# Patient Record
Sex: MALE | Race: WHITE | NOT HISPANIC OR LATINO
[De-identification: names, ages, dates, MRNs, and addresses within clinical notes are randomized per-mention and may not be internally consistent; named-entity substitution may affect disease eponyms.]

---

## 2021-03-13 ENCOUNTER — TRANSCRIPTION ENCOUNTER (OUTPATIENT)
Age: 46
End: 2021-03-13

## 2021-05-03 ENCOUNTER — APPOINTMENT (OUTPATIENT)
Dept: RHEUMATOLOGY | Facility: CLINIC | Age: 46
End: 2021-05-03

## 2021-05-13 ENCOUNTER — NON-APPOINTMENT (OUTPATIENT)
Age: 46
End: 2021-05-13

## 2021-05-14 ENCOUNTER — APPOINTMENT (OUTPATIENT)
Dept: RHEUMATOLOGY | Facility: CLINIC | Age: 46
End: 2021-05-14
Payer: COMMERCIAL

## 2021-05-14 ENCOUNTER — NON-APPOINTMENT (OUTPATIENT)
Age: 46
End: 2021-05-14

## 2021-05-14 VITALS
HEIGHT: 73 IN | DIASTOLIC BLOOD PRESSURE: 62 MMHG | WEIGHT: 188 LBS | TEMPERATURE: 97.8 F | HEART RATE: 67 BPM | BODY MASS INDEX: 24.92 KG/M2 | OXYGEN SATURATION: 97 % | SYSTOLIC BLOOD PRESSURE: 102 MMHG

## 2021-05-14 DIAGNOSIS — I73.00 RAYNAUD'S SYNDROME W/OUT GANGRENE: ICD-10-CM

## 2021-05-14 DIAGNOSIS — G25.0 ESSENTIAL TREMOR: ICD-10-CM

## 2021-05-14 DIAGNOSIS — F41.1 GENERALIZED ANXIETY DISORDER: ICD-10-CM

## 2021-05-14 DIAGNOSIS — Z82.49 FAMILY HISTORY OF ISCHEMIC HEART DISEASE AND OTHER DISEASES OF THE CIRCULATORY SYSTEM: ICD-10-CM

## 2021-05-14 PROCEDURE — 99072 ADDL SUPL MATRL&STAF TM PHE: CPT

## 2021-05-14 PROCEDURE — 99204 OFFICE O/P NEW MOD 45 MIN: CPT

## 2021-05-14 RX ORDER — IBUPROFEN 800 MG/1
TABLET, FILM COATED ORAL
Refills: 0 | Status: ACTIVE | COMMUNITY

## 2021-05-14 RX ORDER — ESCITALOPRAM OXALATE 20 MG/1
20 TABLET, FILM COATED ORAL
Refills: 0 | Status: ACTIVE | COMMUNITY

## 2021-05-14 RX ORDER — GABAPENTIN 100 MG
100 TABLET ORAL
Refills: 0 | Status: ACTIVE | COMMUNITY

## 2021-05-14 NOTE — REVIEW OF SYSTEMS
[As noted in HPI] : as noted in HPI [Negative] : Heme/Lymph [FreeTextEntry5] : triphasic changes  [FreeTextEntry9] : bony enlargement R/L 3 non tender

## 2021-05-14 NOTE — HISTORY OF PRESENT ILLNESS
[FreeTextEntry1] : (Initial HPI 5/14/21) \par 44 yo wm with hx SHIREEN, essential tremor and raynauds, 2 ys ago noted R2 toe- whiteness/ numb eventually all toes/ fingers. w/ numbness .. progressed and now note nearly immediate change with sudden change in temperature.. triphasic changes.. to date no treatment.. no previous digital ulcerations\par Denies sclerodactyly .. but tightness/ puffiness, no joint pain..  +  Chronic dry skin\par ROS: negative overall with significant negatives.. \par No rash, ischemic pain\par NO cytopenisa/ bleeding/ clotting dyscrasias\par Not associated with other sx\par Non smoker.. coffee 3-4 cups/ day no other stimulants\par \par Labs 1/21 + \par nl CBC, CMP, ESR/ CRP, C3/4 (w/ CH50 low 34), TSH, PTH, CK, SPEP, ferritin, PTT, VWF, B12/ folate, PSA w/ neg RF/ CCP, CHAVEZ and subserologies to include APS/ LAC, SCL70, ANCA/ PR3/ MPO, cryoglobulins, ACE, TPO/ TG, tTG, Hep B/ C and neg HsAb, quantiferon gold, Vit D, uric acid, UA, Vit D 51, HbA1c 5.6\par Lipids: , HD 66, ,  \par \par Recurrent sinus infections allegry r/t - 2-3/ yr nothing this yr \par \par Essential tremor x many ys.. in past on BB, nothing needed recently did not have raynauds at that time. \par \par FH: \par sister and MA + raynauds..

## 2021-05-14 NOTE — ASSESSMENT
[FreeTextEntry1] : 46 yo wm with hx SHIREEN, essential tremor and raynauds\par \par 1) Raynauds:  2 ys ago noted R2 toe- whiteness/ numb eventually all toes/ fingers. w/ numbness .. progressed and now note nearly immediate change with sudden change in temperature.. triphasic changes.. to date no treatment.. no previous digital ulcerations\par Denies sclerodactyly .. but tightness/ puffiness, no joint pain..  +  Chronic dry skin\par Comprehensive serologies by PCP negative though SEVERINO was not done.. will do now\par Mildly elevated  (likely due to daily intense exercise), will repeat now \par At this point there is little to suggest CTD, suggesting primary raynauds.  Advise to continue w/ non pharm tx and if necessary in future can consider CCB. Recommend avoiding smoking/ minimize caffeine and other stimulants and avoid BB if possible- all can exacerbate sx. \par FH: \par sister and MA + raynauds..\par \par \par 2) Recurrent sinus infections allegry r/t - 2-3/ yr nothing this yr \par \par 3) Essential tremor x many ys.. in past on BB, nothing needed recently did not have raynauds at that time. \par \par \par Plan: \par - as above non pharm tx.. \par - Check SEVERINO and repeat CK. no other labs needed \par - rto as needed \par -Is aware to call if there is any change in her underlying symptoms.\par \par

## 2021-05-14 NOTE — PHYSICAL EXAM
[General Appearance - Alert] : alert [General Appearance - In No Acute Distress] : in no acute distress [PERRL With Normal Accommodation] : pupils were equal in size, round, and reactive to light [Sclera] : the sclera and conjunctiva were normal [Extraocular Movements] : extraocular movements were intact [Outer Ear] : the ears and nose were normal in appearance [Nasal Cavity] : the nasal mucosa and septum were normal [Oropharynx] : the oropharynx was normal [Neck Appearance] : the appearance of the neck was normal [Neck Cervical Mass (___cm)] : no neck mass was observed [Jugular Venous Distention Increased] : there was no jugular-venous distention [Thyroid Diffuse Enlargement] : the thyroid was not enlarged [Thyroid Nodule] : there were no palpable thyroid nodules [Auscultation Breath Sounds / Voice Sounds] : lungs were clear to auscultation bilaterally [Heart Rate And Rhythm] : heart rate was normal and rhythm regular [Heart Sounds] : normal S1 and S2 [Heart Sounds Gallop] : no gallops [Murmurs] : no murmurs [Heart Sounds Pericardial Friction Rub] : no pericardial rub [Edema] : there was no peripheral edema [Bowel Sounds] : normal bowel sounds [Abdomen Soft] : soft [Abdomen Tenderness] : non-tender [Abdomen Mass (___ Cm)] : no abdominal mass palpated [Cervical Lymph Nodes Enlarged Posterior Bilaterally] : posterior cervical [Cervical Lymph Nodes Enlarged Anterior Bilaterally] : anterior cervical [Supraclavicular Lymph Nodes Enlarged Bilaterally] : supraclavicular [No CVA Tenderness] : no ~M costovertebral angle tenderness [No Spinal Tenderness] : no spinal tenderness [Abnormal Walk] : normal gait [Nail Clubbing] : no clubbing  or cyanosis of the fingernails [Musculoskeletal - Swelling] : no joint swelling seen [Motor Tone] : muscle strength and tone were normal [Skin Color & Pigmentation] : normal skin color and pigmentation [Skin Turgor] : normal skin turgor [] : no rash [Sensation] : the sensory exam was normal to light touch and pinprick [No Focal Deficits] : no focal deficits [Oriented To Time, Place, And Person] : oriented to person, place, and time [Impaired Insight] : insight and judgment were intact [Affect] : the affect was normal [FreeTextEntry1] : no tremor observed

## 2021-05-14 NOTE — DATA REVIEWED
[FreeTextEntry1] : Labs 1/21 + \par nl CBC, CMP, ESR/ CRP, C3/4 (w/ CH50 low 34), TSH, PTH, CK, SPEP, ferritin, PTT, VWF, B12/ folate, PSA w/ neg RF/ CCP, CHAVEZ and subserologies to include APS/ LAC, SCL70, ANCA/ PR3/ MPO, cryoglobulins, ACE, TPO/ TG, tTG, Hep B/ C and neg HsAb, quantiferon gold, Vit D, uric acid, UA, Vit D 51, HbA1c 5.6\par Lipids: , HD 66, ,

## 2021-06-08 ENCOUNTER — RESULT REVIEW (OUTPATIENT)
Age: 46
End: 2021-06-08

## 2022-01-31 ENCOUNTER — EMERGENCY (EMERGENCY)
Facility: HOSPITAL | Age: 47
LOS: 0 days | Discharge: HOME | End: 2022-02-01
Attending: EMERGENCY MEDICINE
Payer: COMMERCIAL

## 2022-01-31 VITALS — HEIGHT: 72 IN | WEIGHT: 184.97 LBS

## 2022-01-31 DIAGNOSIS — R11.14 BILIOUS VOMITING: ICD-10-CM

## 2022-01-31 DIAGNOSIS — R10.9 UNSPECIFIED ABDOMINAL PAIN: ICD-10-CM

## 2022-01-31 LAB
BASOPHILS # BLD AUTO: 0.08 K/UL — SIGNIFICANT CHANGE UP (ref 0–0.2)
BASOPHILS NFR BLD AUTO: 0.6 % — SIGNIFICANT CHANGE UP (ref 0–2)
EOSINOPHIL # BLD AUTO: 0.32 K/UL — SIGNIFICANT CHANGE UP (ref 0–0.5)
EOSINOPHIL NFR BLD AUTO: 2.3 % — SIGNIFICANT CHANGE UP (ref 0–6)
HCT VFR BLD CALC: 38.4 % — LOW (ref 39–50)
HGB BLD-MCNC: 12.9 G/DL — LOW (ref 13–17)
IMM GRANULOCYTES NFR BLD AUTO: 0.4 % — SIGNIFICANT CHANGE UP (ref 0–1.5)
LYMPHOCYTES # BLD AUTO: 1.25 K/UL — SIGNIFICANT CHANGE UP (ref 1–3.3)
LYMPHOCYTES # BLD AUTO: 9 % — LOW (ref 13–44)
MCHC RBC-ENTMCNC: 32.1 PG — SIGNIFICANT CHANGE UP (ref 27–34)
MCHC RBC-ENTMCNC: 33.6 GM/DL — SIGNIFICANT CHANGE UP (ref 32–36)
MCV RBC AUTO: 95.5 FL — SIGNIFICANT CHANGE UP (ref 80–100)
MONOCYTES # BLD AUTO: 1.34 K/UL — HIGH (ref 0–0.9)
MONOCYTES NFR BLD AUTO: 9.7 % — SIGNIFICANT CHANGE UP (ref 2–14)
NEUTROPHILS # BLD AUTO: 10.8 K/UL — HIGH (ref 1.8–7.4)
NEUTROPHILS NFR BLD AUTO: 78 % — HIGH (ref 43–77)
PLATELET # BLD AUTO: 211 K/UL — SIGNIFICANT CHANGE UP (ref 150–400)
RBC # BLD: 4.02 M/UL — LOW (ref 4.2–5.8)
RBC # FLD: 12.7 % — SIGNIFICANT CHANGE UP (ref 10.3–14.5)
WBC # BLD: 13.84 K/UL — HIGH (ref 3.8–10.5)
WBC # FLD AUTO: 13.84 K/UL — HIGH (ref 3.8–10.5)

## 2022-01-31 PROCEDURE — 96374 THER/PROPH/DIAG INJ IV PUSH: CPT

## 2022-01-31 PROCEDURE — 99285 EMERGENCY DEPT VISIT HI MDM: CPT

## 2022-01-31 PROCEDURE — 93005 ELECTROCARDIOGRAM TRACING: CPT

## 2022-01-31 PROCEDURE — 85025 COMPLETE CBC W/AUTO DIFF WBC: CPT

## 2022-01-31 PROCEDURE — 74177 CT ABD & PELVIS W/CONTRAST: CPT | Mod: MA

## 2022-01-31 PROCEDURE — 96375 TX/PRO/DX INJ NEW DRUG ADDON: CPT

## 2022-01-31 PROCEDURE — 93010 ELECTROCARDIOGRAM REPORT: CPT

## 2022-01-31 PROCEDURE — 84484 ASSAY OF TROPONIN QUANT: CPT

## 2022-01-31 PROCEDURE — 99285 EMERGENCY DEPT VISIT HI MDM: CPT | Mod: 25

## 2022-01-31 PROCEDURE — 83690 ASSAY OF LIPASE: CPT

## 2022-01-31 PROCEDURE — 80053 COMPREHEN METABOLIC PANEL: CPT

## 2022-01-31 PROCEDURE — 36415 COLL VENOUS BLD VENIPUNCTURE: CPT

## 2022-01-31 RX ORDER — MORPHINE SULFATE 50 MG/1
4 CAPSULE, EXTENDED RELEASE ORAL ONCE
Refills: 0 | Status: DISCONTINUED | OUTPATIENT
Start: 2022-01-31 | End: 2022-01-31

## 2022-01-31 RX ORDER — FAMOTIDINE 10 MG/ML
20 INJECTION INTRAVENOUS ONCE
Refills: 0 | Status: COMPLETED | OUTPATIENT
Start: 2022-01-31 | End: 2022-01-31

## 2022-01-31 RX ORDER — SODIUM CHLORIDE 9 MG/ML
1000 INJECTION INTRAMUSCULAR; INTRAVENOUS; SUBCUTANEOUS ONCE
Refills: 0 | Status: COMPLETED | OUTPATIENT
Start: 2022-01-31 | End: 2022-01-31

## 2022-01-31 RX ORDER — ONDANSETRON 8 MG/1
4 TABLET, FILM COATED ORAL ONCE
Refills: 0 | Status: COMPLETED | OUTPATIENT
Start: 2022-01-31 | End: 2022-01-31

## 2022-01-31 RX ADMIN — SODIUM CHLORIDE 1000 MILLILITER(S): 9 INJECTION INTRAMUSCULAR; INTRAVENOUS; SUBCUTANEOUS at 23:54

## 2022-01-31 RX ADMIN — ONDANSETRON 4 MILLIGRAM(S): 8 TABLET, FILM COATED ORAL at 23:54

## 2022-01-31 RX ADMIN — FAMOTIDINE 20 MILLIGRAM(S): 10 INJECTION INTRAVENOUS at 23:54

## 2022-01-31 RX ADMIN — MORPHINE SULFATE 4 MILLIGRAM(S): 50 CAPSULE, EXTENDED RELEASE ORAL at 23:54

## 2022-01-31 NOTE — ED ADULT NURSE NOTE - OBJECTIVE STATEMENT
Pt ambulatory to ED for lower abd pain since 1700 this evening. Symptoms include nausea pain and chills. Denies fever, diarrhea or chest pain. No obvious signs of trauma. No acute distress noted at this time.

## 2022-01-31 NOTE — ED ADULT NURSE NOTE - NSIMPLEMENTINTERV_GEN_ALL_ED
Implemented All Fall Risk Interventions:  [place] to call system. Call bell, personal items and telephone within reach. Instruct patient to call for assistance. Room bathroom lighting operational. Non-slip footwear when patient is off stretcher. Physically safe environment: no spills, clutter or unnecessary equipment. Stretcher in lowest position, wheels locked, appropriate side rails in place. Provide visual cue, wrist band, yellow gown, etc. Monitor gait and stability. Monitor for mental status changes and reorient to person, place, and time. Review medications for side effects contributing to fall risk. Reinforce activity limits and safety measures with patient and family.

## 2022-01-31 NOTE — ED ADULT TRIAGE NOTE - CHIEF COMPLAINT QUOTE
Pt presents to the ED c/o lower abdominal pain and cramping since 5pm. +Nausea. Denies dark stools, fevers, dysuria, cp, sob, dizziness. No other complaints.

## 2022-02-01 VITALS
SYSTOLIC BLOOD PRESSURE: 129 MMHG | OXYGEN SATURATION: 96 % | RESPIRATION RATE: 17 BRPM | HEART RATE: 49 BPM | DIASTOLIC BLOOD PRESSURE: 84 MMHG | TEMPERATURE: 98 F

## 2022-02-01 LAB
ALBUMIN SERPL ELPH-MCNC: 3.6 G/DL — SIGNIFICANT CHANGE UP (ref 3.3–5)
ALP SERPL-CCNC: 79 U/L — SIGNIFICANT CHANGE UP (ref 40–120)
ALT FLD-CCNC: 33 U/L — SIGNIFICANT CHANGE UP (ref 12–78)
ANION GAP SERPL CALC-SCNC: 6 MMOL/L — SIGNIFICANT CHANGE UP (ref 5–17)
AST SERPL-CCNC: 19 U/L — SIGNIFICANT CHANGE UP (ref 15–37)
BILIRUB SERPL-MCNC: 0.4 MG/DL — SIGNIFICANT CHANGE UP (ref 0.2–1.2)
BUN SERPL-MCNC: 15 MG/DL — SIGNIFICANT CHANGE UP (ref 7–23)
CALCIUM SERPL-MCNC: 9 MG/DL — SIGNIFICANT CHANGE UP (ref 8.5–10.1)
CHLORIDE SERPL-SCNC: 103 MMOL/L — SIGNIFICANT CHANGE UP (ref 96–108)
CO2 SERPL-SCNC: 28 MMOL/L — SIGNIFICANT CHANGE UP (ref 22–31)
CREAT SERPL-MCNC: 0.93 MG/DL — SIGNIFICANT CHANGE UP (ref 0.5–1.3)
GLUCOSE SERPL-MCNC: 144 MG/DL — HIGH (ref 70–99)
LIDOCAIN IGE QN: 84 U/L — SIGNIFICANT CHANGE UP (ref 73–393)
POTASSIUM SERPL-MCNC: 3.7 MMOL/L — SIGNIFICANT CHANGE UP (ref 3.5–5.3)
POTASSIUM SERPL-SCNC: 3.7 MMOL/L — SIGNIFICANT CHANGE UP (ref 3.5–5.3)
PROT SERPL-MCNC: 6.8 GM/DL — SIGNIFICANT CHANGE UP (ref 6–8.3)
SODIUM SERPL-SCNC: 137 MMOL/L — SIGNIFICANT CHANGE UP (ref 135–145)

## 2022-02-01 PROCEDURE — 74177 CT ABD & PELVIS W/CONTRAST: CPT | Mod: 26,MA

## 2022-02-01 RX ORDER — ONDANSETRON 8 MG/1
1 TABLET, FILM COATED ORAL
Qty: 12 | Refills: 0
Start: 2022-02-01 | End: 2022-02-03

## 2022-02-01 NOTE — ED PROVIDER NOTE - CLINICAL SUMMARY MEDICAL DECISION MAKING FREE TEXT BOX
pt with nv and abdominal pain will hydrate, get labs, give pepcid and zofran re evaluate possible ct abd

## 2022-02-01 NOTE — ED PROVIDER NOTE - PATIENT PORTAL LINK FT
You can access the FollowMyHealth Patient Portal offered by [facility] by registering at the following website: [email or web address]. By joining [employer]’s FollowMyHealth portal, you will also be able to view your health information using other applications (apps) compatible with our system.

## 2022-02-01 NOTE — ED PROVIDER NOTE - NSFOLLOWUPINSTRUCTIONS_ED_ALL_ED_FT
WHAT YOU NEED TO KNOW:    Acute nausea and vomiting start suddenly, worsen quickly, and last a short time.    DISCHARGE INSTRUCTIONS:    Return to the emergency department if:   •You see blood in your vomit or your bowel movements.      •You have sudden, severe pain in your chest and upper abdomen after hard vomiting or retching.      •You have swelling in your neck and chest.       •You are dizzy, cold, and thirsty and your eyes and mouth are dry.      •You are urinating very little or not at all.      •You have muscle weakness, leg cramps, and trouble breathing.       •Your heart is beating much faster than normal.       •You continue to vomit for more than 48 hours.       Contact your healthcare provider if:   •You have frequent dry heaves (vomiting but nothing comes out).      •Your nausea and vomiting does not get better or go away after you use medicine.      •You have questions or concerns about your condition or treatment.      Medicines: You may need any of the following:   •Medicines may be given to calm your stomach and stop your vomiting. You may also need medicines to help you feel more relaxed or to stop nausea and vomiting caused by motion sickness.      •Gastrointestinal stimulants are used to help empty your stomach and bowels. This may help decrease nausea and vomiting.      •Take your medicine as directed. Contact your healthcare provider if you think your medicine is not helping or if you have side effects. Tell him or her if you are allergic to any medicine. Keep a list of the medicines, vitamins, and herbs you take. Include the amounts, and when and why you take them. Bring the list or the pill bottles to follow-up visits. Carry your medicine list with you in case of an emergency.      Prevent or manage acute nausea and vomiting:   •Do not drink alcohol. Alcohol may upset or irritate your stomach. Too much alcohol can also cause acute nausea and vomiting.      •Control stress. Headaches due to stress may cause nausea and vomiting. Find ways to relax and manage your stress. Get more rest and sleep.      •Drink more liquids as directed. Vomiting can lead to dehydration. It is important to drink more liquids to help replace lost body fluids. Ask your healthcare provider how much liquid to drink each day and which liquids are best for you. Your provider may recommend that you drink an oral rehydration solution (ORS). ORS contains water, salts, and sugar that are needed to replace the lost body fluids. Ask what kind of ORS to use, how much to drink, and where to get it.      •Eat smaller meals, more often. Eat small amounts of food every 2 to 3 hours, even if you are not hungry. Food in your stomach may decrease your nausea.      •Talk to your healthcare provider before you take over-the-counter (OTC) medicines. These medicines can cause serious problems if you use certain other medicines, or you have a medical condition. You may have problems if you use too much or use them for longer than the label says. Follow directions on the label carefully.       Follow up with your healthcare provider as directed: Write down your questions so you remember to ask them during your follow-up visits.       © Copyright [employer] 2022           back to top                          © Copyright [employer] 2022

## 2022-02-01 NOTE — ED PROVIDER NOTE - PHYSICAL EXAMINATION
Gen:  Well appearing in NAD  Head:  NC/AT  HEENT: pupils perrl,no pharyngeal erythema, uvula midline  Cardiac: S1S2, RRR  Abd: Soft, rlq ttp  Resp: No distress, CTA   musculoskeletal:: no deformities, no swelling, strength +5/+5  Skin: warm and dry as visualized, no rashes  Neuro: STREET, cn2-12, aao x 4  Psych:alert, cooperative, appropriate mood and affect for situation

## 2022-03-28 PROBLEM — Z78.9 OTHER SPECIFIED HEALTH STATUS: Chronic | Status: ACTIVE | Noted: 2022-02-01

## 2022-04-07 ENCOUNTER — APPOINTMENT (OUTPATIENT)
Dept: ORTHOPEDIC SURGERY | Facility: CLINIC | Age: 47
End: 2022-04-07
Payer: COMMERCIAL

## 2022-04-07 VITALS — HEIGHT: 73 IN | BODY MASS INDEX: 24.92 KG/M2 | WEIGHT: 188 LBS

## 2022-04-07 PROCEDURE — 99214 OFFICE O/P EST MOD 30 MIN: CPT

## 2022-04-11 NOTE — DATA REVIEWED
[MRI] : MRI [Left] : left [Knee] : knee [Report was reviewed and noted in the chart] : The report was reviewed and noted in the chart [I reviewed the films/CD and agree] : I reviewed the films/CD and agree [FreeTextEntry1] : 1. Medial meniscal tear.\par 2. Arthrosis with joint effusion.\par 3. Healed MCL tear at the femur with thickening.\par 4. Hamstring and gastrocnemius tendinopathy with interstitial tears, soft tissue edema, and bursitis.

## 2022-04-11 NOTE — HISTORY OF PRESENT ILLNESS
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

## 2022-04-11 NOTE — PHYSICAL EXAM
[de-identified] : The patient is a well appearing 46 year year old male of their stated age.\par Patient ambulates with a normal gait.\par Negative straight leg raise bilateral\par \par Effected Knee:                         	\par ROM:  0-145 degrees\par \par Lachman: Negative\par Pivot Shift: Negative\par Anterior Drawer: Negative\par Posterior Drawer / Sag: Negative\par Varus Stress 0 degrees: Stable\par Varus Stress 30 degrees: Stable\par Valgus Stress 0 degrees: Stable\par Valgus Stress 30 degrees: Stable\par Medial Real: POSITIVE\par Lateral Real: Negative\par Patella Glide: 2+\par Patella Apprehension: Negative\par Patella Grind: Negative\par \par Palpation:\par Medial Joint Line: TTP\par Lateral Joint Line: Nontender\par Medial Collateral Ligament: Nontender\par Lateral Collateral Ligament/PLC: Nontender\par Distal Femur: Nontender\par Proximal Tibia: Nontender\par Tibial Tubercle: Nontender\par Distal Pole Patella: Nontender\par Quadriceps Tendon: Nontender &  Intact\par Patella Tendon: Nontender &  Intact\par Medial Distal Hamstring/PES: Nontender\par Lateral Distal Hamstring: Nontender & Stable\par Iliotibial Band: Nontender\par Medial Patellofemoral Ligament: Nontender\par Adductor: Nontender\par Proximal GSC-Plantaris: Nontender\par Calf: Supple & Nontender\par \par Inspection:\par Deformity: No\par Erythema: No\par Ecchymosis: No\par Abrasions: No\par Effusion: No\par Prepatellar Bursitis: No\par \par Neurologic Exam:\par Sensation L4-S1: Grossly Intact\par \par Motor Exam:\par Quadriceps: 5 out of 5\par Hamstrings: 5 out of 5\par EHL: 5 out of 5\par FHL: 5 out of 5\par TA: 5 out of 5\par GS: 5 out of 5\par \par Circulatory/Pulses:\par Dorsalis Pedis: 2+\par Posterior Tibialis: 2+\par \par Additional Pertinent Findings: None\par \par Contralateral Knee:                           	\par ROM: 0-145 degrees\par \par Other Pertinent Findings: None\par

## 2022-06-09 ENCOUNTER — APPOINTMENT (OUTPATIENT)
Dept: ORTHOPEDIC SURGERY | Facility: CLINIC | Age: 47
End: 2022-06-09
Payer: COMMERCIAL

## 2022-06-09 VITALS — BODY MASS INDEX: 24.92 KG/M2 | WEIGHT: 188 LBS | HEIGHT: 73 IN

## 2022-06-09 PROCEDURE — 99214 OFFICE O/P EST MOD 30 MIN: CPT | Mod: 57

## 2022-06-13 NOTE — DISCUSSION/SUMMARY
[Medication Risks Reviewed] : Medication risks reviewed [Surgical risks reviewed] : Surgical risks reviewed [de-identified] : BROCKCARMEN and I discussed His issues. His knee pain is likely related to his meniscal tear, and as a result of him not improving significantly with adequate and extensive nonoperative treatment, he is indicated for surgical treatment. Discussed findings of today's exam and possible causes of patient's pain. Educated patient on their most probable diagnosis of MMT. Reviewed possible courses of treatment, and we collaboratively decided best course of treatment at this time will include:\par \par 1. proceed with arthroscopic partial meniscectomy\par \par Consent:  Conservative treatment, nontreatment, nonsurgical intervention and surgical intervention treatment options have been reviewed with the patient.  The patient continues to be symptomatic and has failed conservative treatment, and elects to move forward with surgical intervention.  The patient is indicated for [the above procedure] and all indicated procedures. As such the alternatives, benefits and risks, of the above procedure, including but not limited to bleeding, infection, neurovascular injury, loss of limb, loss of life,  DVT, PE, RSD, inability to return to previous level of activity, inability to return to previous level of employment, advancement of or to osteoarthritic changes, joint instability or motion loss, hardware failure or migration, [malunion or nonunion,] failure to resolve all symptoms, failure to return to sports and need for further procedures, as well as specific risk of [none] were discussed with the patient and/or their legal guardian who agreed to move forward with surgical intervention.  They have reviewed and signed the consent form today after expressing understanding of the above documented conversation. The patient or their representative will contact my office as instructed on the preoperative instruction sheet they received today to schedule surgery in a timely manner as discussed. \par \par \par Follow up 10-14 days after surgery.\par \par Gil Gallagher MD\par Sports Medicine, Shoulder & Elbow Surgery\par [facility]

## 2022-06-13 NOTE — HISTORY OF PRESENT ILLNESS
[Left Leg] : left leg [4] : 4 [Rest] : rest [Full time] : Work status: full time [de-identified] : The patient is a 47 year old right  hand dominant male who presents today complaining of continued instability and pain LEFT knee. He was able to get back to some normal function. He has modified cycling, trying not to lock leg out. Pain is worse trying to walk normal and with wb.  \par Date of Injury/Onset: a progressive onset of pain since February\par Pain:    At Rest: 0/10 \par With Activity:  6/10 \par Mechanism of injury: \par This is none a Work Related Injury being treated under Worker's Compensation.\par This is none an athletic injury occurring associated with an interscholastic or organized sports team.\par Quality of symptoms: sharp \par Improves with: ice/ibuprofen temporarily\par Worse with: walking \par Prior treatment: none \par Prior Imaging: None\par Additional Information: pt heard a pop while doing [employer] on 4/6/22, he has rested from [employer] for a week now. He has to modify. 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

## 2022-06-13 NOTE — PHYSICAL EXAM
[Left] : left knee [5___] : hamstring 5[unfilled]/5 [de-identified] : The patient is a well appearing 46 year year old male of their stated age.\par Patient ambulates with a normal gait.\par Negative straight leg raise bilateral\par \par Effected Knee:                         	\par ROM:  0-145 degrees\par \par Lachman: Negative\par Pivot Shift: Negative\par Anterior Drawer: Negative\par Posterior Drawer / Sag: Negative\par Varus Stress 0 degrees: Stable\par Varus Stress 30 degrees: Stable\par Valgus Stress 0 degrees: Stable\par Valgus Stress 30 degrees: Stable\par Medial Real: POSITIVE\par Lateral Real: Negative\par Patella Glide: 2+\par Patella Apprehension: Negative\par Patella Grind: Negative\par \par Palpation:\par Medial Joint Line: TTP\par Lateral Joint Line: Nontender\par Medial Collateral Ligament: Nontender\par Lateral Collateral Ligament/PLC: Nontender\par Distal Femur: Nontender\par Proximal Tibia: Nontender\par Tibial Tubercle: Nontender\par Distal Pole Patella: Nontender\par Quadriceps Tendon: Nontender &  Intact\par Patella Tendon: Nontender &  Intact\par Medial Distal Hamstring/PES: Nontender\par Lateral Distal Hamstring: Nontender & Stable\par Iliotibial Band: Nontender\par Medial Patellofemoral Ligament: Nontender\par Adductor: Nontender\par Proximal GSC-Plantaris: Nontender\par Calf: Supple & Nontender\par \par Inspection:\par Deformity: No\par Erythema: No\par Ecchymosis: No\par Abrasions: No\par Effusion: No\par Prepatellar Bursitis: No\par \par Neurologic Exam:\par Sensation L4-S1: Grossly Intact\par \par Motor Exam:\par Quadriceps: 5 out of 5\par Hamstrings: 5 out of 5\par EHL: 5 out of 5\par FHL: 5 out of 5\par TA: 5 out of 5\par GS: 5 out of 5\par \par Circulatory/Pulses:\par Dorsalis Pedis: 2+\par Posterior Tibialis: 2+\par \par Additional Pertinent Findings: None\par \par Contralateral Knee:                           	\par ROM: 0-145 degrees\par \par Other Pertinent Findings: None\par   [] : no calf tenderness

## 2022-07-11 ENCOUNTER — APPOINTMENT (OUTPATIENT)
Dept: ORTHOPEDIC SURGERY | Facility: AMBULATORY SURGERY CENTER | Age: 47
End: 2022-07-11

## 2022-07-11 PROCEDURE — 29881 ARTHRS KNE SRG MNISECTMY M/L: CPT | Mod: LT

## 2022-07-11 PROCEDURE — 29881 ARTHRS KNE SRG MNISECTMY M/L: CPT | Mod: AS,LT

## 2022-07-11 RX ORDER — ASPIRIN ENTERIC COATED TABLETS 81 MG 81 MG/1
81 TABLET, DELAYED RELEASE ORAL
Qty: 28 | Refills: 0 | Status: ACTIVE | COMMUNITY
Start: 2022-07-11 | End: 1900-01-01

## 2022-07-11 RX ORDER — ACETAMINOPHEN EXTRA STRENGTH 500 MG/1
500 TABLET ORAL 3 TIMES DAILY
Qty: 90 | Refills: 0 | Status: ACTIVE | COMMUNITY
Start: 2022-07-08 | End: 1900-01-01

## 2022-07-11 RX ORDER — IBUPROFEN 600 MG/1
600 TABLET, FILM COATED ORAL 4 TIMES DAILY
Qty: 60 | Refills: 0 | Status: ACTIVE | COMMUNITY
Start: 2022-07-08 | End: 1900-01-01

## 2022-07-11 RX ORDER — OXYCODONE 5 MG/1
5 TABLET ORAL
Qty: 30 | Refills: 0 | Status: ACTIVE | COMMUNITY
Start: 2022-07-08 | End: 1900-01-01

## 2022-07-11 RX ORDER — ONDANSETRON 4 MG/1
4 TABLET, ORALLY DISINTEGRATING ORAL EVERY 8 HOURS
Qty: 21 | Refills: 0 | Status: ACTIVE | COMMUNITY
Start: 2022-07-08 | End: 1900-01-01

## 2022-07-28 ENCOUNTER — APPOINTMENT (OUTPATIENT)
Dept: ORTHOPEDIC SURGERY | Facility: CLINIC | Age: 47
End: 2022-07-28

## 2022-07-28 DIAGNOSIS — Z48.89 ENCOUNTER FOR OTHER SPECIFIED SURGICAL AFTERCARE: ICD-10-CM

## 2022-07-28 PROCEDURE — 99214 OFFICE O/P EST MOD 30 MIN: CPT

## 2022-08-02 ENCOUNTER — FORM ENCOUNTER (OUTPATIENT)
Age: 47
End: 2022-08-02

## 2022-08-03 ENCOUNTER — APPOINTMENT (OUTPATIENT)
Dept: MRI IMAGING | Facility: CLINIC | Age: 47
End: 2022-08-03

## 2022-08-03 PROBLEM — Z48.89 AFTERCARE FOLLOWING SURGERY: Status: ACTIVE | Noted: 2022-07-08

## 2022-08-03 PROCEDURE — 73721 MRI JNT OF LWR EXTRE W/O DYE: CPT | Mod: RT

## 2022-08-03 NOTE — REASON FOR VISIT
[FreeTextEntry2] : Patient is here today for a followup visit for left knee s/p PMM, and new right knee pain.

## 2022-08-03 NOTE — DISCUSSION/SUMMARY
[de-identified] : Assessment & Plan: The patient is approximately 2 weeks s/p left knee PMM. Sutures removed and Steri Strips applied today. The patient is instructed in wound management. The patient's post-op plan, protocol and activity modifications have been thoroughly discussed and the patient expressed understanding. The patient will control pain as discussed & continue ice and elevation as needed. The patient otherwise may advance activity as discussed.\par \par Also endorsing right knee pain, similar to left side. Will go ahead and get MRI right knee to eval for MMT.\par \par Prescription Medications Ordered: [None]\par Physical Therapy: [Continue per protocol, new prescription given today, continue home exercise program]\par Braces/DME Ordered: [Continue Postop Brace]\par Activity/Work/Sports Status: [Out of work/gym/sports]\par Follow-Up: [4 weeks]

## 2022-08-03 NOTE — PHYSICAL EXAM
[Left] : left knee [de-identified] : The patient is a well appearing 46 year year old male of their stated age.\par Patient ambulates with a normal gait.\par Negative straight leg raise bilateral\par \par Effected Knee:                         	\par ROM:  0-145 degrees\par \par Lachman: Negative\par Pivot Shift: Negative\par Anterior Drawer: Negative\par Posterior Drawer / Sag: Negative\par Varus Stress 0 degrees: Stable\par Varus Stress 30 degrees: Stable\par Valgus Stress 0 degrees: Stable\par Valgus Stress 30 degrees: Stable\par Medial Real: POSITIVE\par Lateral Real: Negative\par Patella Glide: 2+\par Patella Apprehension: Negative\par Patella Grind: Negative\par \par Palpation:\par Medial Joint Line: TTP\par Lateral Joint Line: Nontender\par Medial Collateral Ligament: Nontender\par Lateral Collateral Ligament/PLC: Nontender\par Distal Femur: Nontender\par Proximal Tibia: Nontender\par Tibial Tubercle: Nontender\par Distal Pole Patella: Nontender\par Quadriceps Tendon: Nontender &  Intact\par Patella Tendon: Nontender &  Intact\par Medial Distal Hamstring/PES: Nontender\par Lateral Distal Hamstring: Nontender & Stable\par Iliotibial Band: Nontender\par Medial Patellofemoral Ligament: Nontender\par Adductor: Nontender\par Proximal GSC-Plantaris: Nontender\par Calf: Supple & Nontender\par \par Inspection:\par Deformity: No\par Erythema: No\par Ecchymosis: No\par Abrasions: No\par Effusion: No\par Prepatellar Bursitis: No\par \par Neurologic Exam:\par Sensation L4-S1: Grossly Intact\par \par Motor Exam:\par Quadriceps: 5 out of 5\par Hamstrings: 5 out of 5\par EHL: 5 out of 5\par FHL: 5 out of 5\par TA: 5 out of 5\par GS: 5 out of 5\par \par Circulatory/Pulses:\par Dorsalis Pedis: 2+\par Posterior Tibialis: 2+\par \par Additional Pertinent Findings: None\par \par Contralateral Knee:                           	\par ROM: 0-145 degrees\par \par Other Pertinent Findings: None\par   [5___] : hamstring 5[unfilled]/5 [] : patient ambulates without assistive device [Right] : right knee [There are no fractures, subluxations or dislocations. No significant abnormalities are seen] : There are no fractures, subluxations or dislocations. No significant abnormalities are seen

## 2022-08-03 NOTE — HISTORY OF PRESENT ILLNESS
[de-identified] : The patient is s/p Left knee arthroscopy meniscotomy. Feeling well, No fevers/ chills. \par Date of Surgery:  7/11/22\par Pain:    At Rest: 0/10 \par With Activity:  4/10 \par Mechanism of injury: heard a pop while doing [employer] [employer] on 4/6/22, he has rested from [employer] [employer]\par This is NOT a Work Related Injury being treated under Worker's Compensation.\par This is NOT an athletic injury occurring associated with an interscholastic or organized sports team.\par Treatment/Imaging/Studies Since Last Visit:  SX\par 	Reports Available For Review Today: n/a\par School/Sport/Position/Occupation: Finance \par Additional Information: None\par

## 2022-08-04 ENCOUNTER — TRANSCRIPTION ENCOUNTER (OUTPATIENT)
Age: 47
End: 2022-08-04

## 2022-08-10 ENCOUNTER — APPOINTMENT (OUTPATIENT)
Dept: ORTHOPEDIC SURGERY | Facility: CLINIC | Age: 47
End: 2022-08-10

## 2022-08-11 ENCOUNTER — APPOINTMENT (OUTPATIENT)
Dept: ORTHOPEDIC SURGERY | Facility: CLINIC | Age: 47
End: 2022-08-11

## 2022-08-11 VITALS — WEIGHT: 188 LBS | BODY MASS INDEX: 24.92 KG/M2 | HEIGHT: 73 IN

## 2022-08-11 PROCEDURE — 99214 OFFICE O/P EST MOD 30 MIN: CPT

## 2022-08-11 RX ORDER — HYALURONATE SODIUM 20 MG/2 ML
20 SYRINGE (ML) INTRAARTICULAR
Qty: 3 | Refills: 0 | Status: ACTIVE | COMMUNITY
Start: 2022-08-11

## 2022-08-12 RX ORDER — HYALURONATE SODIUM 20 MG/2 ML
20 SYRINGE (ML) INTRAARTICULAR
Qty: 3 | Refills: 0 | Status: ACTIVE | COMMUNITY
Start: 2022-08-12 | End: 1900-01-01

## 2022-08-14 NOTE — REASON FOR VISIT
[FreeTextEntry2] : Patient is here today for a followup visit for right knee pain and to review MRI.

## 2022-08-14 NOTE — DATA REVIEWED
[MRI] : MRI [Right] : of the right [Knee] : knee [FreeTextEntry1] : 1. Cartilage defects over the weightbearing and posterior weightbearing medial femur with marrow edema and\par no fracture. Joint effusion.\par 2. Lateral subluxation of the patella with minor cartilage fissure lateral over the patellar apex.\par 3. 4-mm traction cyst at the tibial root insertion of the medial meniscus with no definitive meniscal tear.

## 2022-08-14 NOTE — PHYSICAL EXAM
[Left] : left knee [5___] : hamstring 5[unfilled]/5 [Right] : right knee [There are no fractures, subluxations or dislocations. No significant abnormalities are seen] : There are no fractures, subluxations or dislocations. No significant abnormalities are seen [] : non-antalgic [de-identified] : The patient is a well appearing 46 year year old male of their stated age.\par Patient ambulates with a normal gait.\par Negative straight leg raise bilateral\par \par Effected Knee:                         	\par ROM:  0-145 degrees\par \par Lachman: Negative\par Pivot Shift: Negative\par Anterior Drawer: Negative\par Posterior Drawer / Sag: Negative\par Varus Stress 0 degrees: Stable\par Varus Stress 30 degrees: Stable\par Valgus Stress 0 degrees: Stable\par Valgus Stress 30 degrees: Stable\par Medial Real: POSITIVE\par Lateral Real: Negative\par Patella Glide: 2+\par Patella Apprehension: Negative\par Patella Grind: Negative\par \par Palpation:\par Medial Joint Line: TTP\par Lateral Joint Line: Nontender\par Medial Collateral Ligament: Nontender\par Lateral Collateral Ligament/PLC: Nontender\par Distal Femur: Nontender\par Proximal Tibia: Nontender\par Tibial Tubercle: Nontender\par Distal Pole Patella: Nontender\par Quadriceps Tendon: Nontender &  Intact\par Patella Tendon: Nontender &  Intact\par Medial Distal Hamstring/PES: Nontender\par Lateral Distal Hamstring: Nontender & Stable\par Iliotibial Band: Nontender\par Medial Patellofemoral Ligament: Nontender\par Adductor: Nontender\par Proximal GSC-Plantaris: Nontender\par Calf: Supple & Nontender\par \par Inspection:\par Deformity: No\par Erythema: No\par Ecchymosis: No\par Abrasions: No\par Effusion: No\par Prepatellar Bursitis: No\par \par Neurologic Exam:\par Sensation L4-S1: Grossly Intact\par \par Motor Exam:\par Quadriceps: 5 out of 5\par Hamstrings: 5 out of 5\par EHL: 5 out of 5\par FHL: 5 out of 5\par TA: 5 out of 5\par GS: 5 out of 5\par \par Circulatory/Pulses:\par Dorsalis Pedis: 2+\par Posterior Tibialis: 2+\par \par Additional Pertinent Findings: None\par \par Contralateral Knee:                           	\par ROM: 0-145 degrees\par \par Other Pertinent Findings: None\par

## 2022-08-14 NOTE — HISTORY OF PRESENT ILLNESS
[de-identified] : 08/11/2022 \par \colette RANGEL is presenting today for followup. Pain and symptoms are similar to the previous visit. He denies any numbness/tingling/fevers/chills. He underwent an MRI since last visit, and is here to discuss the imaging results.

## 2022-08-14 NOTE — DISCUSSION/SUMMARY
[de-identified] : Patient and I discussed their symptoms, LT knee pain. Discussed findings of today's exam and possible causes of patient's pain. Educated patient on their most probable diagnosis of internal derangement of RT knee . Reviewed possible courses of treatment, and we collaboratively decided best course of treatment at this time will include:\par \par 1. Continue PT\par 2. Euflexxa injection, pending insurance approval\par \par Instructions: Dx / Natural History\par The patient was advised of the diagnosis.  The natural history of the pathology was explained in full to the patient in layman's terms.  Several different treatment options were discussed and explained in full to the patient including the risks and benefits of both surgical and non-surgical treatments.  All questions and concerns were answered. \par \par RICE\par I explained to the patient that rest, ice, compression, and elevation would benefit them.  They may return to activity after follow-up or when they no longer have any pain.\par \par NSAIDs - OTC\par Patient is to begin over the counter oral anti-inflammatory medications on an as needed basis, as long as there are no medical contraindications.  Patient is counseled on possible GI and blood pressure side effects.\par \par Pain Guide Activities\par The patient was advised to let pain guide the gradual advancement of activities.\par \par Icing\par The patient was advised to apply ice (wrapped in a towel or protective covering) to the area daily (20 minutes at a time, 2-4X/day).\par \par Follow up after Euflexxa approved by insurance.

## 2022-09-22 ENCOUNTER — APPOINTMENT (OUTPATIENT)
Dept: ORTHOPEDIC SURGERY | Facility: CLINIC | Age: 47
End: 2022-09-22
Payer: COMMERCIAL

## 2022-09-22 PROCEDURE — 99212 OFFICE O/P EST SF 10 MIN: CPT | Mod: 25

## 2022-09-22 PROCEDURE — 76942 ECHO GUIDE FOR BIOPSY: CPT | Mod: NC

## 2022-09-22 PROCEDURE — 20611 DRAIN/INJ JOINT/BURSA W/US: CPT

## 2022-09-22 PROCEDURE — 20610 DRAIN/INJ JOINT/BURSA W/O US: CPT

## 2022-09-22 NOTE — PROCEDURE
[FreeTextEntry3] : The risks, benefits, and alternatives to viscosupplementation injection were reviewed with the patient. Risks outlined include but are not limited to infection, sepsis, bleeding, scarring, temporary increase in pain, syncopal episode, failure to resolve symptoms, symptoms recurrence, allergic reaction, flare reaction, pseudoseptic reaction.  Patient understood the risks and asked to proceed with this treatment course.\par \par Large joint injection was performed of the right knee. The indication for this procedure was pain, inflammation and x-ray evidence of Osteoarthritis on this or prior visit. The site was prepped with alcohol, betadine, ethyl chloride sprayed topically and sterile technique used. An injection of Euflexxa, series #[1] was used. \par Patient tolerated procedure well. Patient was advised to call if redness, pain or fever occur and apply ice for 15 minutes out of every hour for the next 12-24 hours as tolerated. \par \par Patient has tried OTC's including aspirin, Ibuprofen, Aleve, etc or prescription NSAIDS, and/or exercises at home and/or physical therapy without satisfactory response, patient had decreased mobility in the joint and the risks benefits, and alternatives have been discussed, and verbal consent was obtained. \par \par Ultrasound guidance was indicated for this patient due to precise injection in area of tear. All ultrasound images have been permanently captured and stored accordingly in our picture archiving and communication system. Visualization of the needle and placement of injection was performed without complication.

## 2022-09-22 NOTE — PHYSICAL EXAM
[de-identified] : The patient is a well appearing 46 year year old male of their stated age.\par Patient ambulates with a normal gait.\par Negative straight leg raise bilateral\par \par Effected Knee:                         	\par ROM:  0-145 degrees\par \par Lachman: Negative\par Pivot Shift: Negative\par Anterior Drawer: Negative\par Posterior Drawer / Sag: Negative\par Varus Stress 0 degrees: Stable\par Varus Stress 30 degrees: Stable\par Valgus Stress 0 degrees: Stable\par Valgus Stress 30 degrees: Stable\par Medial Real: POSITIVE\par Lateral Real: Negative\par Patella Glide: 2+\par Patella Apprehension: Negative\par Patella Grind: Negative\par \par Palpation:\par Medial Joint Line: TTP\par Lateral Joint Line: Nontender\par Medial Collateral Ligament: Nontender\par Lateral Collateral Ligament/PLC: Nontender\par Distal Femur: Nontender\par Proximal Tibia: Nontender\par Tibial Tubercle: Nontender\par Distal Pole Patella: Nontender\par Quadriceps Tendon: Nontender &  Intact\par Patella Tendon: Nontender &  Intact\par Medial Distal Hamstring/PES: Nontender\par Lateral Distal Hamstring: Nontender & Stable\par Iliotibial Band: Nontender\par Medial Patellofemoral Ligament: Nontender\par Adductor: Nontender\par Proximal GSC-Plantaris: Nontender\par Calf: Supple & Nontender\par \par Inspection:\par Deformity: No\par Erythema: No\par Ecchymosis: No\par Abrasions: No\par Effusion: No\par Prepatellar Bursitis: No\par \par Neurologic Exam:\par Sensation L4-S1: Grossly Intact\par \par Motor Exam:\par Quadriceps: 5 out of 5\par Hamstrings: 5 out of 5\par EHL: 5 out of 5\par FHL: 5 out of 5\par TA: 5 out of 5\par GS: 5 out of 5\par \par Circulatory/Pulses:\par Dorsalis Pedis: 2+\par Posterior Tibialis: 2+\par \par Additional Pertinent Findings: None\par \par Contralateral Knee:                           	\par ROM: 0-145 degrees\par \par Other Pertinent Findings: None\par

## 2022-09-22 NOTE — HISTORY OF PRESENT ILLNESS
[de-identified] : Patient is presenting today for Euflexxa Injection #[1]. Pain and symptoms are improving. Patient has been exercising, and taking anti-inflammatories as needed. Patient denies any numbness/tingling/fevers/chills.

## 2022-09-22 NOTE — DISCUSSION/SUMMARY
[de-identified] : Pt tolerated procedure well, f/u in 1 week for Euflexxa #[2]\par \par All of the patient's questions were answered to His satisfaction. Diagnoses and potential treatments were reviewed. He agreed with the plan and would like to move forward with it. \par \par History, physical exam, imaging, assessment and plan documented by Jose Antonio Lee. The documentation recorded by the scribe accurately reflects the service I, Gil Gallagher MD, personally performed and the decisions made by me.

## 2022-09-29 ENCOUNTER — APPOINTMENT (OUTPATIENT)
Dept: ORTHOPEDIC SURGERY | Facility: CLINIC | Age: 47
End: 2022-09-29

## 2022-09-29 PROCEDURE — 20611 DRAIN/INJ JOINT/BURSA W/US: CPT | Mod: 79

## 2022-09-29 PROCEDURE — 99212 OFFICE O/P EST SF 10 MIN: CPT | Mod: 25

## 2022-09-29 NOTE — PROCEDURE
[FreeTextEntry3] : The risks, benefits, and alternatives to viscosupplementation injection were reviewed with the patient. Risks outlined include but are not limited to infection, sepsis, bleeding, scarring, temporary increase in pain, syncopal episode, failure to resolve symptoms, symptoms recurrence, allergic reaction, flare reaction, pseudoseptic reaction.  Patient understood the risks and asked to proceed with this treatment course.\par \par Large joint injection was performed of the right knee. The indication for this procedure was pain, inflammation and x-ray evidence of Osteoarthritis on this or prior visit. The site was prepped with alcohol, betadine, ethyl chloride sprayed topically and sterile technique used. An injection of Euflexxa, series #[2] was used. \par Patient tolerated procedure well. Patient was advised to call if redness, pain or fever occur and apply ice for 15 minutes out of every hour for the next 12-24 hours as tolerated. \par \par Patient has tried OTC's including aspirin, Ibuprofen, Aleve, etc or prescription NSAIDS, and/or exercises at home and/or physical therapy without satisfactory response, patient had decreased mobility in the joint and the risks benefits, and alternatives have been discussed, and verbal consent was obtained. \par \par Ultrasound guidance was indicated for this patient due to precise injection in area of tear. All ultrasound images have been permanently captured and stored accordingly in our picture archiving and communication system. Visualization of the needle and placement of injection was performed without complication.

## 2022-09-29 NOTE — PHYSICAL EXAM
[de-identified] : The patient is a well appearing 46 year year old male of their stated age.\par Patient ambulates with a normal gait.\par Negative straight leg raise bilateral\par \par Effected Knee:                         	\par ROM:  0-145 degrees\par \par Lachman: Negative\par Pivot Shift: Negative\par Anterior Drawer: Negative\par Posterior Drawer / Sag: Negative\par Varus Stress 0 degrees: Stable\par Varus Stress 30 degrees: Stable\par Valgus Stress 0 degrees: Stable\par Valgus Stress 30 degrees: Stable\par Medial Real: POSITIVE\par Lateral Real: Negative\par Patella Glide: 2+\par Patella Apprehension: Negative\par Patella Grind: Negative\par \par Palpation:\par Medial Joint Line: TTP\par Lateral Joint Line: Nontender\par Medial Collateral Ligament: Nontender\par Lateral Collateral Ligament/PLC: Nontender\par Distal Femur: Nontender\par Proximal Tibia: Nontender\par Tibial Tubercle: Nontender\par Distal Pole Patella: Nontender\par Quadriceps Tendon: Nontender &  Intact\par Patella Tendon: Nontender &  Intact\par Medial Distal Hamstring/PES: Nontender\par Lateral Distal Hamstring: Nontender & Stable\par Iliotibial Band: Nontender\par Medial Patellofemoral Ligament: Nontender\par Adductor: Nontender\par Proximal GSC-Plantaris: Nontender\par Calf: Supple & Nontender\par \par Inspection:\par Deformity: No\par Erythema: No\par Ecchymosis: No\par Abrasions: No\par Effusion: No\par Prepatellar Bursitis: No\par \par Neurologic Exam:\par Sensation L4-S1: Grossly Intact\par \par Motor Exam:\par Quadriceps: 5 out of 5\par Hamstrings: 5 out of 5\par EHL: 5 out of 5\par FHL: 5 out of 5\par TA: 5 out of 5\par GS: 5 out of 5\par \par Circulatory/Pulses:\par Dorsalis Pedis: 2+\par Posterior Tibialis: 2+\par \par Additional Pertinent Findings: None\par \par Contralateral Knee:                           	\par ROM: 0-145 degrees\par \par Other Pertinent Findings: None\par

## 2022-09-29 NOTE — DISCUSSION/SUMMARY
[de-identified] : Pt tolerated procedure well, f/u in 1 week for Euflexxa #[3]\par \par All of the patient's questions were answered to His satisfaction. Diagnoses and potential treatments were reviewed. He agreed with the plan and would like to move forward with it. \par \par History, physical exam, imaging, assessment and plan documented by Jose Antonio Lee. The documentation recorded by the scribe accurately reflects the service I, Gil Gallagher MD, personally performed and the decisions made by me.

## 2022-09-29 NOTE — HISTORY OF PRESENT ILLNESS
[de-identified] : Patient is presenting today for Euflexxa Injection #[2]. Pain and symptoms are improving. Patient has been exercising, and taking anti-inflammatories as needed. Patient denies any numbness/tingling/fevers/chills.

## 2022-10-06 ENCOUNTER — APPOINTMENT (OUTPATIENT)
Dept: ORTHOPEDIC SURGERY | Facility: CLINIC | Age: 47
End: 2022-10-06
Payer: COMMERCIAL

## 2022-10-06 VITALS — HEIGHT: 73 IN | BODY MASS INDEX: 24.92 KG/M2 | WEIGHT: 188 LBS

## 2022-10-06 PROCEDURE — 20611 DRAIN/INJ JOINT/BURSA W/US: CPT | Mod: 79,RT

## 2022-10-06 PROCEDURE — 20610 DRAIN/INJ JOINT/BURSA W/O US: CPT | Mod: 79,RT

## 2022-10-06 PROCEDURE — 99212 OFFICE O/P EST SF 10 MIN: CPT | Mod: 25

## 2022-10-06 PROCEDURE — 76942 ECHO GUIDE FOR BIOPSY: CPT | Mod: NC

## 2022-10-06 NOTE — PROCEDURE
[FreeTextEntry3] : The risks, benefits, and alternatives to viscosupplementation injection were reviewed with the patient. Risks outlined include but are not limited to infection, sepsis, bleeding, scarring, temporary increase in pain, syncopal episode, failure to resolve symptoms, symptoms recurrence, allergic reaction, flare reaction, pseudoseptic reaction.  Patient understood the risks and asked to proceed with this treatment course.\par \par Large joint injection was performed of the right knee. The indication for this procedure was pain, inflammation and x-ray evidence of Osteoarthritis on this or prior visit. The site was prepped with alcohol, betadine, ethyl chloride sprayed topically and sterile technique used. An injection of Euflexxa, series #[3] was used. \par Patient tolerated procedure well. Patient was advised to call if redness, pain or fever occur and apply ice for 15 minutes out of every hour for the next 12-24 hours as tolerated. \par \par Patient has tried OTC's including aspirin, Ibuprofen, Aleve, etc or prescription NSAIDS, and/or exercises at home and/or physical therapy without satisfactory response, patient had decreased mobility in the joint and the risks benefits, and alternatives have been discussed, and verbal consent was obtained. \par \par Ultrasound guidance was indicated for this patient due to precise injection in area of tear. All ultrasound images have been permanently captured and stored accordingly in our picture archiving and communication system. Visualization of the needle and placement of injection was performed without complication.

## 2022-10-06 NOTE — HISTORY OF PRESENT ILLNESS
[5] : 5 [3] : 3 [Euflexxa] : Euflexxa [de-identified] : Patient is presenting today for Euflexxa Injection #[3] RT knee. Pain and symptoms are improving. Patient has been exercising, and taking anti-inflammatories as needed. Patient denies any numbness/tingling/fevers/chills.  [] : no [FreeTextEntry1] : R KNEE [de-identified] : 9/29/22 [de-identified] : R KNEE

## 2022-10-06 NOTE — PHYSICAL EXAM
[de-identified] : The patient is a well appearing 46 year year old male of their stated age.\par Patient ambulates with a normal gait.\par Negative straight leg raise bilateral\par \par Effected Knee:                         	\par ROM:  0-145 degrees\par \par Lachman: Negative\par Pivot Shift: Negative\par Anterior Drawer: Negative\par Posterior Drawer / Sag: Negative\par Varus Stress 0 degrees: Stable\par Varus Stress 30 degrees: Stable\par Valgus Stress 0 degrees: Stable\par Valgus Stress 30 degrees: Stable\par Medial Real: POSITIVE\par Lateral Real: Negative\par Patella Glide: 2+\par Patella Apprehension: Negative\par Patella Grind: Negative\par \par Palpation:\par Medial Joint Line: TTP\par Lateral Joint Line: Nontender\par Medial Collateral Ligament: Nontender\par Lateral Collateral Ligament/PLC: Nontender\par Distal Femur: Nontender\par Proximal Tibia: Nontender\par Tibial Tubercle: Nontender\par Distal Pole Patella: Nontender\par Quadriceps Tendon: Nontender &  Intact\par Patella Tendon: Nontender &  Intact\par Medial Distal Hamstring/PES: Nontender\par Lateral Distal Hamstring: Nontender & Stable\par Iliotibial Band: Nontender\par Medial Patellofemoral Ligament: Nontender\par Adductor: Nontender\par Proximal GSC-Plantaris: Nontender\par Calf: Supple & Nontender\par \par Inspection:\par Deformity: No\par Erythema: No\par Ecchymosis: No\par Abrasions: No\par Effusion: No\par Prepatellar Bursitis: No\par \par Neurologic Exam:\par Sensation L4-S1: Grossly Intact\par \par Motor Exam:\par Quadriceps: 5 out of 5\par Hamstrings: 5 out of 5\par EHL: 5 out of 5\par FHL: 5 out of 5\par TA: 5 out of 5\par GS: 5 out of 5\par \par Circulatory/Pulses:\par Dorsalis Pedis: 2+\par Posterior Tibialis: 2+\par \par Additional Pertinent Findings: None\par \par Contralateral Knee:                           	\par ROM: 0-145 degrees\par \par Other Pertinent Findings: None\par

## 2022-10-06 NOTE — DISCUSSION/SUMMARY
[de-identified] : Pt tolerated procedure well, f/u as needed.\par \par All of the patient's questions were answered to His satisfaction. Diagnoses and potential treatments were reviewed. He agreed with the plan and would like to move forward with it. \par \par History, physical exam, imaging, assessment and plan documented by Jose Antonio Lee. The documentation recorded by the scribe accurately reflects the service I, Gil Gallagher MD, personally performed and the decisions made by me.

## 2022-10-27 ENCOUNTER — APPOINTMENT (OUTPATIENT)
Dept: ORTHOPEDIC SURGERY | Facility: CLINIC | Age: 47
End: 2022-10-27

## 2022-10-27 DIAGNOSIS — S90.02XA CONTUSION OF LEFT ANKLE, INITIAL ENCOUNTER: ICD-10-CM

## 2022-10-27 PROCEDURE — 73600 X-RAY EXAM OF ANKLE: CPT | Mod: LT

## 2022-10-27 PROCEDURE — 99204 OFFICE O/P NEW MOD 45 MIN: CPT

## 2022-10-27 NOTE — PHYSICAL EXAM
[Left] : left foot and ankle [4___] : eversion 4[unfilled]/5 [5___] : [facility] 5[unfilled]/5 [2+] : posterior tibialis pulse: 2+ [Normal] : saphenous nerve sensation normal [] : Sensation present to light touch in all distributions [FreeTextEntry9] : Pain with eversion

## 2022-10-27 NOTE — HISTORY OF PRESENT ILLNESS
[de-identified] : The patient is a 47 year old R hand dominant M who presents today complaining of left ankle pain \par Date of Injury/Onset: past month \par Pain:    At Rest: 5/10 \par With Activity:  8/10 \par Mechanism of injury: gradual \par This is not a Work Related Injury being treated under Worker's Compensation.\par This is not an athletic injury occurring associated with an interscholastic or organized sports team.\par Quality of symptoms: burning shooting stabbing \par Improves with: rest \par Worse with: activity \par Treatment/Imaging/Studies Since Last Visit: \par 	Reports Available For Review Today: \par Out of work/sport:\par School/Sport/Position/Occupation:\par Change since last visit: \par Additional Information: None\par \par 46 y/o M with L ankle pain x 1 month\par A month ago he had direct trauma to L lateral ankle, pivoted.\par He has tried HEP and nsaids with no improvement of pain\par Denies n/t radiating to toes\par No improvement of pain since DOI.

## 2022-10-27 NOTE — DISCUSSION/SUMMARY
[de-identified] : Assessment: The patient is a 47 year old male with L ankle pain and physical exam findings consistent with L ankle contusion.\par \par Patient and I discussed their symptoms. Discussed findings of today's exam and possible causes of patient's pain. Educated patient on their most probable diagnosis. Reviewed possible courses of treatment, and we collaboratively decided best course of treatment at this time will include:\par \par 1. MRI L ankle\par \par The patient's current medication management of their orthopedic diagnosis was reviewed today: \par \par (1) We discussed a comprehensive treatment plan that included possible pharmaceutical management involving the use of prescription strength medications including but not limited to options such as oral Naprosyn 500mg BID, once daily Meloxicam 15 mg, or 500-650 mg Tylenol versus over the counter oral medications and topical prescription NSAID Pennsaid vs over the counter Voltaren gel. \par \par (2) There is a moderate risk of morbidity with further treatment, especially from use of prescription strength medications and possible side effects of these medications which include upset stomach with oral medications, skin reactions to topical medications and cardiac/renal issues with long term use. \par \par (3) I recommended that the patient follow-up with their medical physician to discuss any significant specific potential issues with long term medication use such as interactions with current medications or with exacerbation of underlying medical comorbidities. \par \par (4) The benefits and risks associated with use of injectable, oral or topical, prescription and over the counter anti-inflammatory medications were discussed with the patient. The patient voiced understanding of the risks including but not limited to bleeding, stroke, kidney dysfunction, heart disease, and were referred to the black box warning label for further information.\par \par Follow up after MRI. \par \par History, physical exam, imaging, assessment and plan documented by Jose Antonio Adhikari. The documentation recorded by the scribe accurately reflects the service I, Gil Gallagher MD, personally performed and the decisions made by me.

## 2022-11-01 ENCOUNTER — FORM ENCOUNTER (OUTPATIENT)
Age: 47
End: 2022-11-01

## 2022-11-02 ENCOUNTER — APPOINTMENT (OUTPATIENT)
Dept: MRI IMAGING | Facility: CLINIC | Age: 47
End: 2022-11-02

## 2022-11-02 PROCEDURE — 73721 MRI JNT OF LWR EXTRE W/O DYE: CPT | Mod: LT

## 2022-11-09 ENCOUNTER — APPOINTMENT (OUTPATIENT)
Dept: ORTHOPEDIC SURGERY | Facility: CLINIC | Age: 47
End: 2022-11-09

## 2022-11-09 PROCEDURE — 99443: CPT

## 2022-11-23 ENCOUNTER — TRANSCRIPTION ENCOUNTER (OUTPATIENT)
Age: 47
End: 2022-11-23

## 2022-11-30 ENCOUNTER — APPOINTMENT (OUTPATIENT)
Dept: ORTHOPEDIC SURGERY | Facility: CLINIC | Age: 47
End: 2022-11-30

## 2022-11-30 VITALS — BODY MASS INDEX: 23.19 KG/M2 | WEIGHT: 175 LBS | HEIGHT: 73 IN

## 2022-11-30 DIAGNOSIS — S96.912A STRAIN OF UNSPECIFIED MUSCLE AND TENDON AT ANKLE AND FOOT LEVEL, LEFT FOOT, INITIAL ENCOUNTER: ICD-10-CM

## 2022-11-30 DIAGNOSIS — S82.62XA DISPLACED FRACTURE OF LATERAL MALLEOLUS OF LEFT FIBULA, INITIAL ENCOUNTER FOR CLOSED FRACTURE: ICD-10-CM

## 2022-11-30 DIAGNOSIS — M19.072 PRIMARY OSTEOARTHRITIS, LEFT ANKLE AND FOOT: ICD-10-CM

## 2022-11-30 DIAGNOSIS — Z78.9 OTHER SPECIFIED HEALTH STATUS: ICD-10-CM

## 2022-11-30 PROCEDURE — 99214 OFFICE O/P EST MOD 30 MIN: CPT

## 2022-11-30 NOTE — HISTORY OF PRESENT ILLNESS
[5] : 5 [1] : 2 [Dull/Aching] : dull/aching [Sharp] : sharp [Rest] : rest [Exercising] : exercising [de-identified] : 11/30/2022: 6 weeks ankle pain [employer]. was seeing dr collado who did xray/mri. walking in reg shoes. having some mild continued pain. no prior sig ankle probs. denies dm/tob. banking compliance [] : no [FreeTextEntry1] : LT ankle

## 2022-11-30 NOTE — PHYSICAL EXAM
[Left] : left foot and ankle [NL (40)] : plantar flexion 40 degrees [NL 30)] : inversion 30 degrees [NL (20)] : eversion 20 degrees [5___] : [facility] 5[unfilled]/5 [2+] : dorsalis pedis pulse: 2+ [] : patient ambulates without assistive device [FreeTextEntry8] : minimal ttp at distal fib [de-identified] : 5/5 EDL

## 2022-11-30 NOTE — DATA REVIEWED
[Outside X-rays] : outside x-rays [MRI] : MRI [Right] : of the right [Ankle] : ankle [I reviewed the films/CD and additionally noted] : I reviewed the films/CD and additionally noted [FreeTextEntry1] : no acute fx [FreeTextEntry2] : non displaced lat mall avulsion; ankle degen changes w/ subacute ocd; likely partial tear EDL

## 2022-12-06 ENCOUNTER — TRANSCRIPTION ENCOUNTER (OUTPATIENT)
Age: 47
End: 2022-12-06

## 2023-01-05 ENCOUNTER — APPOINTMENT (OUTPATIENT)
Dept: ORTHOPEDIC SURGERY | Facility: CLINIC | Age: 48
End: 2023-01-05
Payer: COMMERCIAL

## 2023-01-05 VITALS — HEIGHT: 73 IN | WEIGHT: 175 LBS | BODY MASS INDEX: 23.19 KG/M2

## 2023-01-05 PROCEDURE — 99214 OFFICE O/P EST MOD 30 MIN: CPT

## 2023-01-05 RX ORDER — HYALURONATE SODIUM 20 MG/2 ML
20 SYRINGE (ML) INTRAARTICULAR
Qty: 3 | Refills: 0 | Status: ACTIVE | COMMUNITY
Start: 2023-01-05

## 2023-01-05 NOTE — DISCUSSION/SUMMARY
[de-identified] : Assessment: The patient is a 47 year old male with L knee pain and physical exam findings consistent with s/p medial meniscotomy and L knee OA.\par \par Patient and I discussed their symptoms. Discussed findings of today's exam and possible causes of patient's pain. Educated patient on their most probable diagnosis. Reviewed possible courses of treatment, and we collaboratively decided best course of treatment at this time will include:\par \par 1. Will request authorization for Euflexxa L knee\par \par The patient's current medication management of their orthopedic diagnosis was reviewed today: \par \par (1) We discussed a comprehensive treatment plan that included possible pharmaceutical management involving the use of prescription strength medications including but not limited to options such as oral Naprosyn 500mg BID, once daily Meloxicam 15 mg, or 500-650 mg Tylenol versus over the counter oral medications and topical prescription NSAID Pennsaid vs over the counter Voltaren gel. \par \par (2) There is a moderate risk of morbidity with further treatment, especially from use of prescription strength medications and possible side effects of these medications which include upset stomach with oral medications, skin reactions to topical medications and cardiac/renal issues with long term use. \par \par (3) I recommended that the patient follow-up with their medical physician to discuss any significant specific potential issues with long term medication use such as interactions with current medications or with exacerbation of underlying medical comorbidities. \par \par (4) The benefits and risks associated with use of injectable, oral or topical, prescription and over the counter anti-inflammatory medications were discussed with the patient. The patient voiced understanding of the risks including but not limited to bleeding, stroke, kidney dysfunction, heart disease, and were referred to the black box warning label for further information.\par \par Prior to appointment and during encounter with patient extensive medical records were reviewed including but not limited to, hospital records, out patient records, imaging results, and lab data. During this appointment the patient was examined, diagnoses were discussed and explained in a face to face manner. In addition extensive time was spent reviewing aforementioned diagnostic studies. Counseling including abnormal image results, differential diagnoses, treatment options, risk and benefits, lifestyle changes, current condition, and current medications was performed. Patient's comments, questions, and concerns were address and patient verbalized understanding.\par \par Follow up in 6 months\par \par History, physical exam, imaging, assessment and plan documented by Jose Antonio Adhikari. The documentation recorded by the scribe accurately reflects the service I, Gil Gallagher MD, personally performed and the decisions made by me.

## 2023-01-05 NOTE — IMAGING
[de-identified] : The patient is a well appearing 47 year old male of their stated age.\par Patient ambulates with a ANTALGIC gait.\par Negative straight leg raise bilateral\par \par General: in no acute distress, seated comfortably, moving easily\par Skin: No discoloration, rashes; on palpation skin is dry, \par Neuro: Normal sensation all dermatomes, motor all myotomes\par Vascular: Normal pulses, no edema, normal temperature\par Coordination and balance: Normal\par Psych: normal mood and affect, non pressured speech, alert and oriented x3\par \par Right Knee:                         	\par ROM:  0-145 degrees\par \par Lachman: Negative\par Pivot Shift: Negative\par Anterior Drawer: Negative\par Posterior Drawer / Sag: Negative\par Varus Stress 0 degrees: Stable\par Varus Stress 30 degrees: Stable\par Valgus Stress 0 degrees: Stable\par Valgus Stress 30 degrees: Stable\par Medial Real: Positive\par Lateral Real: Negative\par Patella Glide: 2+\par Patella Apprehension: Negative\par Patella Grind: Positive\par \par Palpation:\par Medial Joint Line: TTP\par Lateral Joint Line: TTP\par Medial Collateral Ligament: Nontender\par Lateral Collateral Ligament/PLC: Nontender\par Distal Femur: Nontender\par Proximal Tibia: Nontender\par Tibial Tubercle: Nontender\par Distal Pole Patella: Nontender\par Quadriceps Tendon: Nontender &  Intact\par Patella Tendon: Nontender &  Intact\par Medial Distal Hamstring/PES: Nontender\par Lateral Distal Hamstring: Nontender & Stable\par Iliotibial Band: Nontender\par Medial Patellofemoral Ligament: Nontender\par Adductor: Nontender\par Proximal GSC-Plantaris: Nontender\par Calf: Supple & Nontender\par \par Inspection:\par Deformity: No\par Erythema: No\par Ecchymosis: No\par Abrasions: No\par Effusion: Moderate\par Prepatellar Bursitis: No\par \par Neurologic Exam:\par Sensation L4-S1: Grossly Intact\par \par Motor Exam:\par Quadriceps: 5 out of 5\par Hamstrings: 5 out of 5\par EHL: 5 out of 5\par FHL: 5 out of 5\par TA: 5 out of 5\par GS: 5 out of 5\par \par Circulatory/Pulses:\par Dorsalis Pedis: 2+\par Posterior Tibialis: 2+\par \par Additional Pertinent Findings: None\par \par Left Knee:                           	\par ROM:  0-145 degrees\par \par Lachman: Negative\par Pivot Shift: Negative\par Anterior Drawer: Negative\par Posterior Drawer / Sag: Negative\par Varus Stress 0 degrees: Stable\par Varus Stress 30 degrees: Stable\par Valgus Stress 0 degrees: Stable\par Valgus Stress 30 degrees: Stable\par Medial Real: Positive\par Lateral Real: Negative\par Patella Glide: 2+\par Patella Apprehension: Negative\par Patella Grind: Positive\par \par Palpation:\par Medial Joint Line: TTP\par Lateral Joint Line: TTP\par Medial Collateral Ligament: Nontender\par Lateral Collateral Ligament/PLC: Nontender\par Distal Femur: Nontender\par Proximal Tibia: Nontender\par Tibial Tubercle: Nontender\par Distal Pole Patella: Nontender\par Quadriceps Tendon: Nontender &  Intact\par Patella Tendon: Nontender &  Intact\par Medial Distal Hamstring/PES: Nontender\par Lateral Distal Hamstring: Nontender & Stable\par Iliotibial Band: Nontender\par Medial Patellofemoral Ligament: Nontender\par Adductor: Nontender\par Proximal GSC-Plantaris: Nontender\par Calf: Supple & Nontender\par \par Inspection:\par Deformity: No\par Erythema: No\par Ecchymosis: No\par Abrasions: No\par Effusion: Moderate\par Prepatellar Bursitis: No\par Healed incisions\par \par Neurologic Exam:\par Sensation L4-S1: Grossly Intact\par \par Motor Exam:\par Quadriceps: 5 out of 5\par Hamstrings: 5 out of 5\par EHL: 5 out of 5\par FHL: 5 out of 5\par TA: 5 out of 5\par GS: 5 out of 5\par \par Circulatory/Pulses:\par Dorsalis Pedis: 2+\par Posterior Tibialis: 2+

## 2023-01-05 NOTE — HISTORY OF PRESENT ILLNESS
[de-identified] : The patient is 6 months s/p Left knee arthroscopy meniscotomy. Feeling well, No fevers/ chills. \par Date of Surgery: 7/11/22\par Pain: At Rest: 0/10 \par With Activity: 4/10 \par Mechanism of injury: heard a pop while doing [employer] [employer] Do on 4/6/22, he has rested from [employer]\par This is NOT a Work Related Injury being treated under Worker's Compensation.\par This is NOT an athletic injury occurring associated with an interscholastic or organized sports team.\par Treatment/Imaging/Studies Since Last Visit: none \par 	Reports Available For Review Today: n/a\par School/Sport/Position/Occupation: Finance \par Additional Information: None\par \par 01/05/2023: Patient is here today for a followup visit for L knee pain s/o L knee meniscotomy 6 months ago. He has been experiencing clicking in his L knee that occurs occasionally. He denies locking of his knee. He has returned to regular activity.

## 2023-08-10 ENCOUNTER — APPOINTMENT (OUTPATIENT)
Dept: ORTHOPEDIC SURGERY | Facility: CLINIC | Age: 48
End: 2023-08-10
Payer: COMMERCIAL

## 2023-08-10 PROCEDURE — 99214 OFFICE O/P EST MOD 30 MIN: CPT

## 2023-08-14 NOTE — IMAGING
[de-identified] : The patient is a well appearing 16 year old male of their stated age. Patient ambulates with a crutches. Negative straight leg raise bilateral  LEFT Knee:       Incisions are well healed              	 ROM:  0-130 degrees  Lachman: Negative Pivot Shift: Negative Anterior Drawer: Negative Posterior Drawer / Sag: Negative Varus Stress 0 degrees: Stable Varus Stress 30 degrees: Stable Valgus Stress 0 degrees: Stable Valgus Stress 30 degrees: Stable Medial Real: Positive Lateral Real: Negative Patella Glide: 2+ Patella Apprehension: Negative Patella Grind: Negative  Palpation: Medial Joint Line: TTP Lateral Joint Line: Nontender Medial Collateral Ligament: Nontender Lateral Collateral Ligament/PLC: Nontender Distal Femur: Nontender Proximal Tibia: Nontender Tibial Tubercle: Nontender Distal Pole Patella: Nontender Quadriceps Tendon: Nontender &  Intact Patella Tendon: Nontender &  Intact Medial Distal Hamstring/PES: Nontender Lateral Distal Hamstring: Nontender & Stable Iliotibial Band: Nontender Medial Patellofemoral Ligament: Nontender Adductor: Nontender Proximal GSC-Plantaris: Nontender Calf: Supple & Nontender  Inspection: Deformity: No Erythema: No Ecchymosis: No Abrasions: No Effusion: Moderate Prepatellar Bursitis: No  Neurologic Exam: Sensation L4-S1: Grossly Intact  Motor Exam: Quadriceps: 5 out of 5 Hamstrings: 5 out of 5 EHL: 5 out of 5 FHL: 5 out of 5 TA: 5 out of 5 GS: 5 out of 5  Circulatory/Pulses: Dorsalis Pedis: 2+ Posterior Tibialis: 2+  Additional Pertinent Findings: None  Contralateral Knee:               	 ROM: 0-130 degrees  Other Pertinent Findings: None

## 2023-08-14 NOTE — HISTORY OF PRESENT ILLNESS
[de-identified] : The patient is s/p Left knee arthroscopy meniscectomy. Feeling well, No fevers/ chills. Date of Surgery: 7/11/22 Pain: At Rest: 2-3/10 With Activity:  4/10 Mechanism of injury heard a pop while doing [employer] [employer] [employer] on 4/6/22, he has rested from [employer] [employer] Treatment/Imaging/Studies Since Last Visit: At home   Reports Available For Review Today: n/a School/Sport/Position/Occupation: Finance Additional Information: Patient reports riding the peloton and knee inflamed, continues to experience aching pain.

## 2023-08-15 ENCOUNTER — APPOINTMENT (OUTPATIENT)
Dept: MRI IMAGING | Facility: CLINIC | Age: 48
End: 2023-08-15
Payer: COMMERCIAL

## 2023-08-15 PROCEDURE — 73721 MRI JNT OF LWR EXTRE W/O DYE: CPT | Mod: LT

## 2023-08-24 ENCOUNTER — APPOINTMENT (OUTPATIENT)
Dept: ORTHOPEDIC SURGERY | Facility: CLINIC | Age: 48
End: 2023-08-24
Payer: COMMERCIAL

## 2023-08-24 PROCEDURE — 99214 OFFICE O/P EST MOD 30 MIN: CPT | Mod: 25

## 2023-08-24 PROCEDURE — 20610 DRAIN/INJ JOINT/BURSA W/O US: CPT | Mod: LT

## 2023-08-24 PROCEDURE — 76942 ECHO GUIDE FOR BIOPSY: CPT | Mod: NC

## 2023-08-31 NOTE — PROCEDURE
[FreeTextEntry3] : The risks, benefits, and alternatives to viscosupplementation injection were reviewed with the patient. Risks outlined include but are not limited to infection, sepsis, bleeding, scarring, temporary increase in pain, syncopal episode, failure to resolve symptoms, symptoms recurrence, allergic reaction, flare reaction, pseudoseptic reaction.  Patient understood the risks and asked to proceed with this treatment course.  Large joint injection was performed of the left knee. The indication for this procedure was pain, inflammation and x-ray evidence of Osteoarthritis on this or prior visit. The site was prepped with alcohol, betadine, ethyl chloride sprayed topically and sterile technique used. An injection of Euflexxa, series #1 was used.   Patient tolerated procedure well. Patient was advised to call if redness, pain or fever occur and apply ice for 15 minutes out of every hour for the next 12-24 hours as tolerated.   Patient has tried OTC's including aspirin, Ibuprofen, Aleve, etc or prescription NSAIDS, and/or exercises at home and/or physical therapy without satisfactory response, patient had decreased mobility in the joint and the risks benefits, and alternatives have been discussed, and verbal consent was obtained.   Ultrasound guidance was indicated for this patient due to precise injection in area of tear. All ultrasound images have been permanently captured and stored accordingly in our picture archiving and communication system. Visualization of the needle and placement of injection was performed without complication.

## 2023-08-31 NOTE — DISCUSSION/SUMMARY
[de-identified] : Assessment:   The patient presents with history, examination and imaging that are most consistent with a diagnosis of:   LT KNEE MMT and LT KNEE OA   The patient would like to pursue conservative measures at this time. After consideration of various non-operative treatment modalities, the patient would like to proceed with the modalities listed below.   Prior to appointment and during encounter with patient extensive medical records were reviewed including but not limited to, hospital records, out patient records, imaging results, and lab data. During this appointment the patient was examined, diagnoses were discussed and explained in a face to face manner. In addition extensive time was spent reviewing aforementioned diagnostic studies. Counseling including abnormal image results, differential diagnoses, treatment options, risk and benefits, lifestyle changes, current condition, and current medications was performed. Patient's comments, questions, and concerns were address and patient verbalized understanding.  ---------------------------   Plan:    Counseling:     - Patient recommended to modify their activities until symptoms resolve.   The patient's current medication management of their orthopedic diagnosis was reviewed today:   (1) We discussed a comprehensive treatment plan that included possible pharmaceutical management involving the use of prescription strength medications including but not limited to options such as oral Naprosyn 500mg BID, once daily Meloxicam 15 mg, or 500-650 mg Tylenol versus over the counter oral medications and topical prescription NSAID Pennsaid vs over the counter Voltaren gel.   (2) There is a moderate risk of morbidity with further treatment, especially from use of prescription strength medications and possible side effects of these medications which include upset stomach with oral medications, skin reactions to topical medications and cardiac/renal issues with long term use.   (3) I recommended that the patient follow-up with their medical physician to discuss any significant specific potential issues with long term medication use such as interactions with current medications or with exacerbation of underlying medical comorbidities.   (4) The benefits and risks associated with use of injectable, oral or topical, prescription and over the counter anti-inflammatory medications were discussed with the patient. The patient voiced understanding of the risks including but not limited to bleeding, stroke, kidney dysfunction, heart disease, and were referred to the black box warning label for further information.   Injection:     - EUFLEXXA injection performed today in the office.     - Refer to procedure section for further details.   Follow-up:  1 weeks for Euflexxa #2

## 2023-08-31 NOTE — HISTORY OF PRESENT ILLNESS
[de-identified] : 08/24/2023   CLAIRE is presenting today for followup. Pain and symptoms are similar to the previous visit. He denies any numbness/tingling/fevers/chills. He underwent an MRI since last visit, and is here to discuss the imaging results.

## 2023-08-31 NOTE — IMAGING
[de-identified] : The patient is a well appearing 48 year old male of their stated age. Patient ambulates with a crutches. Negative straight leg raise bilateral  LEFT Knee:       Incisions are well healed              	 ROM:  0-130 degrees  Lachman: Negative Pivot Shift: Negative Anterior Drawer: Negative Posterior Drawer / Sag: Negative Varus Stress 0 degrees: Stable Varus Stress 30 degrees: Stable Valgus Stress 0 degrees: Stable Valgus Stress 30 degrees: Stable Medial Real: Positive Lateral Real: Negative Patella Glide: 2+ Patella Apprehension: Negative Patella Grind: Negative  Palpation: Medial Joint Line: TTP Lateral Joint Line: Nontender Medial Collateral Ligament: Nontender Lateral Collateral Ligament/PLC: Nontender Distal Femur: Nontender Proximal Tibia: Nontender Tibial Tubercle: Nontender Distal Pole Patella: Nontender Quadriceps Tendon: Nontender &  Intact Patella Tendon: Nontender &  Intact Medial Distal Hamstring/PES: Nontender Lateral Distal Hamstring: Nontender & Stable Iliotibial Band: Nontender Medial Patellofemoral Ligament: Nontender Adductor: Nontender Proximal GSC-Plantaris: Nontender Calf: Supple & Nontender  Inspection: Deformity: No Erythema: No Ecchymosis: No Abrasions: No Effusion: Moderate Prepatellar Bursitis: No  Neurologic Exam: Sensation L4-S1: Grossly Intact  Motor Exam: Quadriceps: 5 out of 5 Hamstrings: 5 out of 5 EHL: 5 out of 5 FHL: 5 out of 5 TA: 5 out of 5 GS: 5 out of 5  Circulatory/Pulses: Dorsalis Pedis: 2+ Posterior Tibialis: 2+  Additional Pertinent Findings: None  Contralateral Knee:               	 ROM: 0-130 degrees  Other Pertinent Findings: None

## 2023-08-31 NOTE — DATA REVIEWED
[MRI] : MRI [Left] : left [Knee] : knee [Report was reviewed and noted in the chart] : The report was reviewed and noted in the chart [I independently reviewed and interpreted images and report] : I independently reviewed and interpreted images and report [FreeTextEntry1] : NO EVIDENCE OF RECURRENT TEAR; PROGRESSION OF CHONDRAL LOSS OVER MFC; EFFUSION AND SYNOVISTIS

## 2023-09-08 ENCOUNTER — APPOINTMENT (OUTPATIENT)
Dept: ORTHOPEDIC SURGERY | Facility: CLINIC | Age: 48
End: 2023-09-08
Payer: COMMERCIAL

## 2023-09-08 VITALS — HEIGHT: 73 IN | WEIGHT: 175 LBS | BODY MASS INDEX: 23.19 KG/M2

## 2023-09-08 PROCEDURE — 99213 OFFICE O/P EST LOW 20 MIN: CPT | Mod: 25

## 2023-09-08 PROCEDURE — 20610 DRAIN/INJ JOINT/BURSA W/O US: CPT | Mod: LT

## 2023-09-08 NOTE — DISCUSSION/SUMMARY
[de-identified] : "Written by Celia Singh, acting as Scribe for Momo Thurman MD."  Dr. Thurman -  The documentation recorded by the scribe accurately reflects the service I personally performed and the decisions made by me.

## 2023-09-08 NOTE — HISTORY OF PRESENT ILLNESS
[7] : 7 [Dull/Aching] : dull/aching [Euflexxa] : Euflexxa [2] : 2 [de-identified] : 09/08/23:  Patient is here today for left knee Euflexxa injection #2.  Patient of Dr. Gallagher.  08/24/2023   CLAIRE is presenting today for followup. Pain and symptoms are similar to the previous visit. He denies any numbness/tingling/fevers/chills. He underwent an MRI since last visit, and is here to discuss the imaging results.  [] : Post Surgical Visit: no [FreeTextEntry1] : left knee [FreeTextEntry5] : 47 y/o M here for euflexxa injection #2 in left knee. Pain level is the same.

## 2023-09-08 NOTE — IMAGING
[de-identified] : The patient is a well appearing 48 year old male of their stated age. Patient ambulates with a crutches. Negative straight leg raise bilateral  LEFT Knee:       Incisions are well healed              	 ROM:  0-130 degrees  Lachman: Negative Pivot Shift: Negative Anterior Drawer: Negative Posterior Drawer / Sag: Negative Varus Stress 0 degrees: Stable Varus Stress 30 degrees: Stable Valgus Stress 0 degrees: Stable Valgus Stress 30 degrees: Stable Medial Real: Positive Lateral Real: Negative Patella Glide: 2+ Patella Apprehension: Negative Patella Grind: Negative  Palpation: Medial Joint Line: TTP Lateral Joint Line: Nontender Medial Collateral Ligament: Nontender Lateral Collateral Ligament/PLC: Nontender Distal Femur: Nontender Proximal Tibia: Nontender Tibial Tubercle: Nontender Distal Pole Patella: Nontender Quadriceps Tendon: Nontender &  Intact Patella Tendon: Nontender &  Intact Medial Distal Hamstring/PES: Nontender Lateral Distal Hamstring: Nontender & Stable Iliotibial Band: Nontender Medial Patellofemoral Ligament: Nontender Adductor: Nontender Proximal GSC-Plantaris: Nontender Calf: Supple & Nontender  Inspection: Deformity: No Erythema: No Ecchymosis: No Abrasions: No Effusion: Moderate Prepatellar Bursitis: No  Neurologic Exam: Sensation L4-S1: Grossly Intact  Motor Exam: Quadriceps: 5 out of 5 Hamstrings: 5 out of 5 EHL: 5 out of 5 FHL: 5 out of 5 TA: 5 out of 5 GS: 5 out of 5  Circulatory/Pulses: Dorsalis Pedis: 2+ Posterior Tibialis: 2+  Additional Pertinent Findings: None  Contralateral Knee:               	 ROM: 0-130 degrees  Other Pertinent Findings: None

## 2023-09-08 NOTE — PLAN
[TextEntry] : The natural progression of osteoarthritis was explained to the patient.  We discussed the possible treatment options from conservative to operative.  These included NSAIDs, Glucosamine and Chondroitin sulfate, and physical therapy as well different types of injections.  We also discussed that at some point they may progress to need a TKA.  Information and pamphlets were given.   Euflexxa #2 given today.  Patient may weightbear as tolerated.  The patient was instructed on the importance of ice and elevation of the extremity to decrease swelling and pain.

## 2023-09-15 ENCOUNTER — APPOINTMENT (OUTPATIENT)
Dept: ORTHOPEDIC SURGERY | Facility: CLINIC | Age: 48
End: 2023-09-15
Payer: COMMERCIAL

## 2023-09-15 VITALS — HEIGHT: 73 IN | BODY MASS INDEX: 23.19 KG/M2 | WEIGHT: 175 LBS

## 2023-09-15 DIAGNOSIS — Z78.9 OTHER SPECIFIED HEALTH STATUS: ICD-10-CM

## 2023-09-15 PROCEDURE — 20610 DRAIN/INJ JOINT/BURSA W/O US: CPT | Mod: LT

## 2023-09-15 PROCEDURE — 99213 OFFICE O/P EST LOW 20 MIN: CPT | Mod: 25

## 2023-09-19 ENCOUNTER — TRANSCRIPTION ENCOUNTER (OUTPATIENT)
Age: 48
End: 2023-09-19

## 2023-11-16 ENCOUNTER — APPOINTMENT (OUTPATIENT)
Dept: ORTHOPEDIC SURGERY | Facility: CLINIC | Age: 48
End: 2023-11-16
Payer: COMMERCIAL

## 2023-11-16 DIAGNOSIS — S83.249A OTHER TEAR OF MEDIAL MENISCUS, CURRENT INJURY, UNSPECIFIED KNEE, INITIAL ENCOUNTER: ICD-10-CM

## 2023-11-16 PROCEDURE — 99214 OFFICE O/P EST MOD 30 MIN: CPT

## 2024-01-11 ENCOUNTER — TRANSCRIPTION ENCOUNTER (OUTPATIENT)
Age: 49
End: 2024-01-11

## 2024-01-16 ENCOUNTER — RESULT REVIEW (OUTPATIENT)
Age: 49
End: 2024-01-16

## 2024-05-08 ENCOUNTER — APPOINTMENT (OUTPATIENT)
Dept: ORTHOPEDIC SURGERY | Facility: CLINIC | Age: 49
End: 2024-05-08
Payer: COMMERCIAL

## 2024-05-08 VITALS — HEIGHT: 73 IN | WEIGHT: 175 LBS | BODY MASS INDEX: 23.19 KG/M2

## 2024-05-08 DIAGNOSIS — M71.22 SYNOVIAL CYST OF POPLITEAL SPACE [BAKER], LEFT KNEE: ICD-10-CM

## 2024-05-08 DIAGNOSIS — M23.91 UNSPECIFIED INTERNAL DERANGEMENT OF RIGHT KNEE: ICD-10-CM

## 2024-05-08 DIAGNOSIS — S83.104A UNSPECIFIED DISLOCATION OF RIGHT KNEE, INITIAL ENCOUNTER: ICD-10-CM

## 2024-05-08 DIAGNOSIS — M17.12 UNILATERAL PRIMARY OSTEOARTHRITIS, LEFT KNEE: ICD-10-CM

## 2024-05-08 DIAGNOSIS — Z98.890 OTHER SPECIFIED POSTPROCEDURAL STATES: ICD-10-CM

## 2024-05-08 DIAGNOSIS — Z00.00 ENCOUNTER FOR GENERAL ADULT MEDICAL EXAMINATION W/OUT ABNORMAL FINDINGS: ICD-10-CM

## 2024-05-08 PROCEDURE — 73564 X-RAY EXAM KNEE 4 OR MORE: CPT | Mod: 50

## 2024-05-08 PROCEDURE — 99204 OFFICE O/P NEW MOD 45 MIN: CPT

## 2024-05-10 RX ORDER — HYALURONATE SODIUM 20 MG/2 ML
20 SYRINGE (ML) INTRAARTICULAR
Qty: 6 | Refills: 0 | Status: ACTIVE | COMMUNITY
Start: 2023-01-06 | End: 1900-01-01

## 2024-05-15 ENCOUNTER — APPOINTMENT (OUTPATIENT)
Dept: MRI IMAGING | Facility: CLINIC | Age: 49
End: 2024-05-15
Payer: COMMERCIAL

## 2024-05-15 PROCEDURE — 73721 MRI JNT OF LWR EXTRE W/O DYE: CPT | Mod: RT

## 2024-05-19 PROBLEM — Z00.00 ENCOUNTER FOR PREVENTIVE HEALTH EXAMINATION: Status: ACTIVE | Noted: 2021-03-10

## 2024-05-19 NOTE — HISTORY OF PRESENT ILLNESS
[de-identified] : The patient is a 48 year old [right/left] hand dominant male who presents today complaining of b/l knee pain Date of Injury/Onset: LT Knee a few months ago. RT Knee about 1 month ago  Pain:    At Rest: LT 2/10 RT 2/10 With Activity:  LT 2/10    RT 9/10 Mechanism of injury: LT knee cyst was drained in Jan, came back two months after. RT knee randomly gets very painful and hard to use.  This is not a Work Related Injury being treated under Worker's Compensation. This is not an athletic injury occurring associated with an interscholastic or organized sports team. Quality of symptoms: dull aching  Improves with: Time Worse with: nothing  Prior treatment: no formal. Tried ice and Ibuprofen Prior Imaging: LT knee 1 year ago.  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

## 2024-05-19 NOTE — IMAGING
[Bilateral] : knee bilaterally [There are no fractures, subluxations or dislocations. No significant abnormalities are seen] : There are no fractures, subluxations or dislocations. No significant abnormalities are seen [Mild tricompartmental OA medial narrowing] : Mild tricompartmental OA medial narrowing [de-identified] : Constitutional: The patient appears well developed, well nourished. Skin: No impressive skin lesions present, except as noted in detailed exam. Lymphatic: No palpable lymphadenopathy in examined body areas. Neurologic: Alert and oriented to time, place and person.   BILATERAL KNEE:  Inspection: mild effusion Palpation: medial joint line tenderness, anterior tenderness Knee Range of Motion: 3-125 Strength: 5/5 Quadriceps strength, 5/5 Hamstring strength Neurological: light touch is intact throughout Ligament Stability and Special Tests: McMurrays: neg Lachman: neg Pivot Shift: neg Posterior Drawer: neg Valgus: neg Varus: neg Patella Apprehension: neg Patella Maltracking: neg

## 2024-05-19 NOTE — DISCUSSION/SUMMARY
[de-identified] : Assessment: The patient is a 48 year old male with right knee pain and physical exam findings consistent with internal derangement   Patient and I discussed their symptoms. Discussed findings of today's exam and possible causes of patient's pain. Educated patient on their most probable diagnosis. Reviewed possible courses of treatment, and we collaboratively decided best course of treatment at this time will include:  1. MRI of right knee to eval for loose body and/or meniscus tear 2. Discussed HA inj - submitted auth for Euflexxa for B/L knees  The patient's current medication management of their orthopedic diagnosis was reviewed today:   (1) We discussed a comprehensive treatment plan that included possible pharmaceutical management involving the use of prescription strength medications including but not limited to options such as oral Naprosyn 500mg BID, once daily Meloxicam 15 mg, or 500-650 mg Tylenol versus over the counter oral medications and topical prescription NSAID Pennsaid vs over the counter Voltaren gel.   (2) There is a moderate risk of morbidity with further treatment, especially from use of prescription strength medications and possible side effects of these medications which include upset stomach with oral medications, skin reactions to topical medications and cardiac/renal issues with long term use.   (3) I recommended that the patient follow-up with their medical physician to discuss any significant specific potential issues with long term medication use such as interactions with current medications or with exacerbation of underlying medical comorbidities.   (4) The benefits and risks associated with use of injectable, oral or topical, prescription and over the counter anti-inflammatory medications were discussed with the patient. The patient voiced understanding of the risks including but not limited to bleeding, stroke, kidney dysfunction, heart disease, and were referred to the black box warning label for further information.  Follow up after MRI.

## 2024-05-20 ENCOUNTER — APPOINTMENT (OUTPATIENT)
Dept: ORTHOPEDIC SURGERY | Facility: CLINIC | Age: 49
End: 2024-05-20
Payer: COMMERCIAL

## 2024-05-20 PROCEDURE — 99443: CPT | Mod: 93

## 2024-05-31 ENCOUNTER — APPOINTMENT (OUTPATIENT)
Dept: ORTHOPEDIC SURGERY | Facility: CLINIC | Age: 49
End: 2024-05-31

## 2024-06-10 ENCOUNTER — APPOINTMENT (OUTPATIENT)
Dept: ORTHOPEDIC SURGERY | Facility: CLINIC | Age: 49
End: 2024-06-10
Payer: COMMERCIAL

## 2024-06-10 PROCEDURE — 76942 ECHO GUIDE FOR BIOPSY: CPT | Mod: NC

## 2024-06-10 PROCEDURE — 99213 OFFICE O/P EST LOW 20 MIN: CPT | Mod: 25

## 2024-06-10 PROCEDURE — 20610 DRAIN/INJ JOINT/BURSA W/O US: CPT | Mod: 50

## 2024-06-10 NOTE — DATA REVIEWED
[MRI] : MRI [Right] : of the right [Knee] : knee [Report was reviewed and noted in the chart] : The report was reviewed and noted in the chart [I independently reviewed and interpreted images and report] : I independently reviewed and interpreted images and report [I reviewed the films/CD] : I reviewed the films/CD [FreeTextEntry1] : [facility] 5/15/24: 1. No definitive meniscal tear 2. Multifocal cartilage defects on both sides of the medial joint with reactive marrow edema and no fracture 3. Lateral subluxation of the patella with minor fissure over the lateral patellar apex. Moderate fissure over the central trochlea 4. Hamstring and gastrocnemius tendinopathy with insertional tears and bursitis at the posterior femur. Ruptured popliteal cyst 5. ACL mucoid change

## 2024-06-10 NOTE — PROCEDURE
[FreeTextEntry3] : The risks, benefits, and alternatives to viscosupplementation injection were reviewed with the patient. Risks outlined include but are not limited to infection, sepsis, bleeding, scarring, temporary increase in pain, syncopal episode, failure to resolve symptoms, symptoms recurrence, allergic reaction, flare reaction, pseudoseptic reaction.  Patient understood the risks and asked to proceed with this treatment course.  Large joint injection was performed of B/L knees. The indication for this procedure was pain, inflammation and x-ray evidence of Osteoarthritis on this or prior visit. The site was prepped with alcohol, betadine, ethyl chloride sprayed topically and sterile technique used. An injection of Euflexxa, series #[1] was used.  Patient tolerated procedure well. Patient was advised to call if redness, pain or fever occur and apply ice for 15 minutes out of every hour for the next 12-24 hours as tolerated.   Patient has tried OTC's including aspirin, Ibuprofen, Aleve, etc or prescription NSAIDS, and/or exercises at home and/or physical therapy without satisfactory response, patient had decreased mobility in the joint and the risks benefits, and alternatives have been discussed, and verbal consent was obtained.   Ultrasound guidance was indicated for this patient due to precise injection in area of tear. All ultrasound images have been permanently captured and stored accordingly in our picture archiving and communication system. Visualization of the needle and placement of injection was performed without complication.

## 2024-06-10 NOTE — DISCUSSION/SUMMARY
[de-identified] : Assessment:   49 year male with history, physical exam and imaging consistent with:      ---------------------------     Plan: - Euflexxa inj #1 in B/L knees - william well -ice as needed.  -f/u 1 week to continue series.

## 2024-06-10 NOTE — HISTORY OF PRESENT ILLNESS
[de-identified] : 6/10/24: Patient here for follow up of B/L knees and start Euflexxa injections today  The patient is a 48 year old [right/left] hand dominant male who presents today complaining of b/l knee pain Date of Injury/Onset: LT Knee a few months ago. RT Knee about 1 month ago  Pain:    At Rest: LT 2/10 RT 2/10 With Activity:  LT 2/10    RT 9/10 Mechanism of injury: LT knee cyst was drained in Jan, came back two months after. RT knee randomly gets very painful and hard to use.  This is not a Work Related Injury being treated under Worker's Compensation. This is not an athletic injury occurring associated with an interscholastic or organized sports team. Quality of symptoms: dull aching  Improves with: Time Worse with: nothing  Prior treatment: no formal. Tried ice and Ibuprofen Prior Imaging: LT knee 1 year ago.  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

## 2024-06-10 NOTE — IMAGING
[de-identified] : Constitutional: The patient appears well developed, well nourished. Skin: No impressive skin lesions present, except as noted in detailed exam. Lymphatic: No palpable lymphadenopathy in examined body areas. Neurologic: Alert and oriented to time, place and person.   BILATERAL KNEE:  Inspection: mild effusion Palpation: medial joint line tenderness, anterior tenderness Knee Range of Motion: 3-125 Strength: 5/5 Quadriceps strength, 5/5 Hamstring strength Neurological: light touch is intact throughout Ligament Stability and Special Tests: McMurrays: neg Lachman: neg Pivot Shift: neg Posterior Drawer: neg Valgus: neg Varus: neg Patella Apprehension: neg Patella Maltracking: neg

## 2024-06-17 ENCOUNTER — APPOINTMENT (OUTPATIENT)
Dept: ORTHOPEDIC SURGERY | Facility: CLINIC | Age: 49
End: 2024-06-17
Payer: COMMERCIAL

## 2024-06-17 PROCEDURE — 20610 DRAIN/INJ JOINT/BURSA W/O US: CPT | Mod: 50

## 2024-06-17 PROCEDURE — 76942 ECHO GUIDE FOR BIOPSY: CPT | Mod: NC

## 2024-06-17 PROCEDURE — 99213 OFFICE O/P EST LOW 20 MIN: CPT | Mod: 25

## 2024-06-22 NOTE — HISTORY OF PRESENT ILLNESS
[de-identified] : 6/17/24: Patient here for follow up of B/L knees and Euflexxa inj #2  6/10/24: Patient here for follow up of B/L knees and start Euflexxa injections today  The patient is a 48 year old [right/left] hand dominant male who presents today complaining of b/l knee pain Date of Injury/Onset: LT Knee a few months ago. RT Knee about 1 month ago  Pain:    At Rest: LT 2/10 RT 2/10 With Activity:  LT 2/10    RT 9/10 Mechanism of injury: LT knee cyst was drained in Jan, came back two months after. RT knee randomly gets very painful and hard to use.  This is not a Work Related Injury being treated under Worker's Compensation. This is not an athletic injury occurring associated with an interscholastic or organized sports team. Quality of symptoms: dull aching  Improves with: Time Worse with: nothing  Prior treatment: no formal. Tried ice and Ibuprofen Prior Imaging: LT knee 1 year ago.  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

## 2024-06-22 NOTE — DISCUSSION/SUMMARY
[de-identified] : Assessment:   49 year male with history, physical exam and imaging consistent with: B/L knee arthritis     ---------------------------     Plan: - Euflexxa inj #2 in B/L knees - william well -ice as needed.  -f/u 1 week to continue series.

## 2024-06-22 NOTE — IMAGING
[de-identified] : Constitutional: The patient appears well developed, well nourished. Skin: No impressive skin lesions present, except as noted in detailed exam. Lymphatic: No palpable lymphadenopathy in examined body areas. Neurologic: Alert and oriented to time, place and person.   BILATERAL KNEE:  Inspection: mild effusion Palpation: medial joint line tenderness, anterior tenderness Knee Range of Motion: 3-125 Strength: 5/5 Quadriceps strength, 5/5 Hamstring strength Neurological: light touch is intact throughout Ligament Stability and Special Tests: McMurrays: neg Lachman: neg Pivot Shift: neg Posterior Drawer: neg Valgus: neg Varus: neg Patella Apprehension: neg Patella Maltracking: neg

## 2024-06-22 NOTE — PROCEDURE
[FreeTextEntry3] : The risks, benefits, and alternatives to viscosupplementation injection were reviewed with the patient. Risks outlined include but are not limited to infection, sepsis, bleeding, scarring, temporary increase in pain, syncopal episode, failure to resolve symptoms, symptoms recurrence, allergic reaction, flare reaction, pseudoseptic reaction.  Patient understood the risks and asked to proceed with this treatment course.  Large joint injection was performed of B/L knees. The indication for this procedure was pain, inflammation and x-ray evidence of Osteoarthritis on this or prior visit. The site was prepped with alcohol, betadine, ethyl chloride sprayed topically and sterile technique used. An injection of Euflexxa, series #[2] was used.   Patient tolerated procedure well. Patient was advised to call if redness, pain or fever occur and apply ice for 15 minutes out of every hour for the next 12-24 hours as tolerated.   Patient has tried OTC's including aspirin, Ibuprofen, Aleve, etc or prescription NSAIDS, and/or exercises at home and/or physical therapy without satisfactory response, patient had decreased mobility in the joint and the risks benefits, and alternatives have been discussed, and verbal consent was obtained.   Ultrasound guidance was indicated for this patient due to precise injection in area of tear. All ultrasound images have been permanently captured and stored accordingly in our picture archiving and communication system. Visualization of the needle and placement of injection was performed without complication.

## 2024-06-24 ENCOUNTER — APPOINTMENT (OUTPATIENT)
Dept: ORTHOPEDIC SURGERY | Facility: CLINIC | Age: 49
End: 2024-06-24

## 2024-06-24 VITALS — WEIGHT: 175 LBS | HEIGHT: 73 IN | BODY MASS INDEX: 23.19 KG/M2

## 2024-06-24 DIAGNOSIS — M17.0 BILATERAL PRIMARY OSTEOARTHRITIS OF KNEE: ICD-10-CM

## 2024-06-24 PROCEDURE — 20610 DRAIN/INJ JOINT/BURSA W/O US: CPT | Mod: 50

## 2024-06-24 PROCEDURE — 99213 OFFICE O/P EST LOW 20 MIN: CPT | Mod: 25

## 2024-06-24 PROCEDURE — 76942 ECHO GUIDE FOR BIOPSY: CPT | Mod: NC

## 2024-07-01 PROBLEM — M17.0 ARTHRITIS OF BOTH KNEES: Status: ACTIVE | Noted: 2024-05-10

## 2024-08-16 ENCOUNTER — NON-APPOINTMENT (OUTPATIENT)
Age: 49
End: 2024-08-16

## 2025-01-10 ENCOUNTER — EMERGENCY (EMERGENCY)
Facility: HOSPITAL | Age: 50
LOS: 0 days | Discharge: HOME | End: 2025-01-11
Attending: STUDENT IN AN ORGANIZED HEALTH CARE EDUCATION/TRAINING PROGRAM
Payer: COMMERCIAL

## 2025-01-10 VITALS
TEMPERATURE: 99 F | OXYGEN SATURATION: 96 % | SYSTOLIC BLOOD PRESSURE: 149 MMHG | HEART RATE: 79 BPM | RESPIRATION RATE: 18 BRPM | WEIGHT: 184.97 LBS | DIASTOLIC BLOOD PRESSURE: 82 MMHG

## 2025-01-10 DIAGNOSIS — D72.829 ELEVATED WHITE BLOOD CELL COUNT, UNSPECIFIED: ICD-10-CM

## 2025-01-10 DIAGNOSIS — M67.431 GANGLION, RIGHT WRIST: ICD-10-CM

## 2025-01-10 LAB
ALBUMIN SERPL ELPH-MCNC: 4 G/DL — SIGNIFICANT CHANGE UP (ref 3.3–5)
ALP SERPL-CCNC: 113 U/L — SIGNIFICANT CHANGE UP (ref 40–120)
ALT FLD-CCNC: 16 U/L — SIGNIFICANT CHANGE UP (ref 12–78)
ANION GAP SERPL CALC-SCNC: 6 MMOL/L — SIGNIFICANT CHANGE UP (ref 5–17)
AST SERPL-CCNC: 15 U/L — SIGNIFICANT CHANGE UP (ref 15–37)
BILIRUB SERPL-MCNC: 0.3 MG/DL — SIGNIFICANT CHANGE UP (ref 0.2–1.2)
BUN SERPL-MCNC: 9 MG/DL — SIGNIFICANT CHANGE UP (ref 7–23)
CALCIUM SERPL-MCNC: 9.9 MG/DL — SIGNIFICANT CHANGE UP (ref 8.5–10.1)
CHLORIDE SERPL-SCNC: 103 MMOL/L — SIGNIFICANT CHANGE UP (ref 96–108)
CO2 SERPL-SCNC: 28 MMOL/L — SIGNIFICANT CHANGE UP (ref 22–31)
CREAT SERPL-MCNC: 0.9 MG/DL — SIGNIFICANT CHANGE UP (ref 0.5–1.3)
CRP SERPL-MCNC: 13.9 MG/ML — HIGH (ref 0–5)
EGFR: 104 ML/MIN/1.73M2 — SIGNIFICANT CHANGE UP
ERYTHROCYTE [SEDIMENTATION RATE] IN BLOOD: 20 MM/HR — SIGNIFICANT CHANGE UP (ref 0–20)
GLUCOSE SERPL-MCNC: 111 MG/DL — HIGH (ref 70–99)
HCT VFR BLD CALC: 41 % — SIGNIFICANT CHANGE UP (ref 39–50)
HGB BLD-MCNC: 13.9 G/DL — SIGNIFICANT CHANGE UP (ref 13–17)
MCHC RBC-ENTMCNC: 31.9 PG — SIGNIFICANT CHANGE UP (ref 27–34)
MCHC RBC-ENTMCNC: 33.9 G/DL — SIGNIFICANT CHANGE UP (ref 32–36)
MCV RBC AUTO: 94 FL — SIGNIFICANT CHANGE UP (ref 80–100)
PLATELET # BLD AUTO: 319 K/UL — SIGNIFICANT CHANGE UP (ref 150–400)
POTASSIUM SERPL-MCNC: 3.9 MMOL/L — SIGNIFICANT CHANGE UP (ref 3.5–5.3)
POTASSIUM SERPL-SCNC: 3.9 MMOL/L — SIGNIFICANT CHANGE UP (ref 3.5–5.3)
PROT SERPL-MCNC: 7.9 GM/DL — SIGNIFICANT CHANGE UP (ref 6–8.3)
RBC # BLD: 4.36 M/UL — SIGNIFICANT CHANGE UP (ref 4.2–5.8)
RBC # FLD: 12 % — SIGNIFICANT CHANGE UP (ref 10.3–14.5)
SODIUM SERPL-SCNC: 137 MMOL/L — SIGNIFICANT CHANGE UP (ref 135–145)
WBC # BLD: 12.45 K/UL — HIGH (ref 3.8–10.5)
WBC # FLD AUTO: 12.45 K/UL — HIGH (ref 3.8–10.5)

## 2025-01-10 PROCEDURE — 73090 X-RAY EXAM OF FOREARM: CPT | Mod: 26,LT

## 2025-01-10 PROCEDURE — 86140 C-REACTIVE PROTEIN: CPT

## 2025-01-10 PROCEDURE — 73130 X-RAY EXAM OF HAND: CPT | Mod: 26,LT

## 2025-01-10 PROCEDURE — 85652 RBC SED RATE AUTOMATED: CPT

## 2025-01-10 PROCEDURE — 80053 COMPREHEN METABOLIC PANEL: CPT

## 2025-01-10 PROCEDURE — 73110 X-RAY EXAM OF WRIST: CPT | Mod: LT

## 2025-01-10 PROCEDURE — 36415 COLL VENOUS BLD VENIPUNCTURE: CPT

## 2025-01-10 PROCEDURE — 99285 EMERGENCY DEPT VISIT HI MDM: CPT

## 2025-01-10 PROCEDURE — 73110 X-RAY EXAM OF WRIST: CPT | Mod: 26,LT

## 2025-01-10 PROCEDURE — 73130 X-RAY EXAM OF HAND: CPT | Mod: LT

## 2025-01-10 PROCEDURE — 96374 THER/PROPH/DIAG INJ IV PUSH: CPT

## 2025-01-10 PROCEDURE — 96375 TX/PRO/DX INJ NEW DRUG ADDON: CPT

## 2025-01-10 PROCEDURE — 73090 X-RAY EXAM OF FOREARM: CPT | Mod: LT

## 2025-01-10 PROCEDURE — 85027 COMPLETE CBC AUTOMATED: CPT

## 2025-01-10 PROCEDURE — 99284 EMERGENCY DEPT VISIT MOD MDM: CPT | Mod: 25

## 2025-01-10 RX ORDER — ACETAMINOPHEN 80 MG/.8ML
1000 SOLUTION/ DROPS ORAL ONCE
Refills: 0 | Status: COMPLETED | OUTPATIENT
Start: 2025-01-10 | End: 2025-01-10

## 2025-01-10 RX ORDER — DEXAMETHASONE SODIUM PHOSPHATE 4 MG/ML
10 VIAL (ML) INJECTION ONCE
Refills: 0 | Status: COMPLETED | OUTPATIENT
Start: 2025-01-10 | End: 2025-01-10

## 2025-01-10 RX ORDER — ONDANSETRON 4 MG/1
4 TABLET ORAL ONCE
Refills: 0 | Status: COMPLETED | OUTPATIENT
Start: 2025-01-10 | End: 2025-01-10

## 2025-01-10 RX ORDER — MORPHINE SULFATE 15 MG
2 TABLET, EXTENDED RELEASE ORAL ONCE
Refills: 0 | Status: DISCONTINUED | OUTPATIENT
Start: 2025-01-10 | End: 2025-01-10

## 2025-01-10 RX ORDER — DEXAMETHASONE SODIUM PHOSPHATE 4 MG/ML
10 VIAL (ML) INJECTION ONCE
Refills: 0 | Status: DISCONTINUED | OUTPATIENT
Start: 2025-01-10 | End: 2025-01-10

## 2025-01-10 RX ORDER — KETOROLAC TROMETHAMINE 30 MG/ML
30 INJECTION INTRAMUSCULAR; INTRAVENOUS ONCE
Refills: 0 | Status: DISCONTINUED | OUTPATIENT
Start: 2025-01-10 | End: 2025-01-10

## 2025-01-10 RX ADMIN — KETOROLAC TROMETHAMINE 30 MILLIGRAM(S): 30 INJECTION INTRAMUSCULAR; INTRAVENOUS at 22:55

## 2025-01-10 RX ADMIN — Medication 102 MILLIGRAM(S): at 23:19

## 2025-01-10 RX ADMIN — ACETAMINOPHEN 400 MILLIGRAM(S): 80 SOLUTION/ DROPS ORAL at 22:56

## 2025-01-10 RX ADMIN — Medication 2 MILLIGRAM(S): at 22:55

## 2025-01-10 RX ADMIN — ONDANSETRON 4 MILLIGRAM(S): 4 TABLET ORAL at 22:54

## 2025-01-10 NOTE — ED STATDOCS - PHYSICAL EXAMINATION
Constitutional: NAD, alert, verbal  HEENT: NCAT, EOMi, PERRL  Cardiac: RRR no MRG  Resp: clear, no wheezing or crackles  GI: ab soft ntnd, no r/g  MSK/Ext: no edema, left wrist swelling over the radial aspect of the wrist with mild tenderness, no erythema, normal ROM, 2+ distal pulses, normal capillary refill   Neuro: STREET  Skin: No rashes Constitutional: well appearing, NAD AAOx3  Eyes: EOMI, PERRL  Head: Normocephalic atraumatic  Mouth: no airway obstruction, posterior oropharynx clear without erythema or exudate  Neck: supple  Cardiac: regular rate and rhythm, no MRG  Resp: Lungs CTAB  GI: Abd s/nt/nd  Neuro: CN2-12 intact, strength 5/5x4, sensation grossly intact  Skin: No rashes  MSK: left wrist swelling over the radial aspect of the wrist with mild tenderness, no erythema, induration or fluctuance, normal ROM, 2+ distal pulses, normal capillary refill

## 2025-01-10 NOTE — ED STATDOCS - OBJECTIVE STATEMENT
49 year old male w PMHx anxiety presents to the ED with wrist pain. Pt states he fell around 22 years ago on wrist, didn't bother him much. Last night woke him up in sleep with pain. +numbness, finger pain, swelling. Denies history of gout, fever, elbow pain. takes Setraline for anxiety. 49 year old male w PMHx anxiety presents to the ED with wrist pain. Pt states he fell 22 years ago on wrist, and since then has had intermittent "flare ups". Last night woke him up in sleep with pain. Endorses paresthesias into fingers and swelling of wrist. Denies history of gout, fever, elbow pain, rash, numbness, trauma.

## 2025-01-10 NOTE — ED STATDOCS - CARE PROVIDER_API CALL
Sangeetha Polk  Orthopaedic Surgery  [address] [place] [address]  Phone: [phone]  Fax: [phone]  Follow Up Time:

## 2025-01-10 NOTE — ED STATDOCS - PROGRESS NOTE DETAILS
49 year old male w PMHx anxiety presents to the ED with wrist pain. Pt states he fell around 22 years ago on wrist, didn't bother him much. Last night woke him up in sleep with pain. +numbness, finger pain, swelling. Denies history of gout, fever, elbow pain. takes Setraline for anxiety.    Exam: ROm intact, cystic structure overlying bony prominence of medial wrist    Plan: Labs, xray, fluids, pain control, Hand consult?  Yarely Thurman, DNP Expressing relief of symptoms, will follow up with hand out patient. Pt is well appearing walking with steady gait, stable for discharge and follow up without fail with medical doctor. I had a detailed discussion with the patient and/or guardian regarding the historical points, exam findings, and any diagnostic results supporting the discharge diagnosis. Pt educated on care and need for follow up. Strict return instructions and red flag signs and symptoms discussed with patient. Questions answered. Pt shows understanding of discharge information and agrees to follow.

## 2025-01-10 NOTE — ED STATDOCS - CLINICAL SUMMARY MEDICAL DECISION MAKING FREE TEXT BOX
presentation concerning for cyst, wrist sprain, tendinitis. Plan for pain control, steroid, x-ray imaging, labs. monitor reassessment. presentation concerning for ganglion cyst, wrist sprain, tendinitis. Low suspicion for septic joint as pt is afebrile with normal ROM of wrist. Plan for pain control, steroid, x-ray imaging, labs. monitor reassessment. XR shows no acute fx or dislocation. After pain medications pt feels much better, labs show mild leukocytosis 12. Pt advised to f/u with pcp and hand surgery, given strict return  precautions and dc in stable condition

## 2025-01-10 NOTE — ED ADULT TRIAGE NOTE - CHIEF COMPLAINT QUOTE
Pt ambulatory to ED with c/o L wrist pain. L wrist noted to be swollen, pt applied ice and compression PTA, no meds taken PTA. Pt states pain began at 2am last night and woke him from sleep, denies trauma to area.

## 2025-01-10 NOTE — ED STATDOCS - PATIENT PORTAL LINK FT
You can access the FollowMyHealth Patient Portal offered by [facility] by registering at the following website: [email or web address]. By joining [facility]’s FollowMyHealth portal, you will also be able to view your health information using other applications (apps) compatible with our system.

## 2025-01-10 NOTE — ED STATDOCS - NSFOLLOWUPINSTRUCTIONS_ED_ALL_ED_FT
Ganglion Cyst    WHAT YOU NEED TO KNOW:    What is a ganglion cyst? A ganglion cyst is an abnormal buildup of fluid under the skin. They are most common on the wrists, feet, or ankles, but can be found anywhere on the body. The cause is not known. You may have a higher risk for a ganglion cyst if you injure your joint.  Ganglion Cyst    What are the signs and symptoms of a ganglion cyst?    A round, firm lump    A lump that changes size and may disappear or reappear    Numbness, swelling, or muscle weakness around the joint where you have the cyst    Pain in a joint that has the cyst  How is a ganglion cyst diagnosed? Your healthcare provider will examine the cyst and ask how long you have had it. The provider will move your joint around to feel the cyst and see if it moves. Tell the provider if the cyst keeps you from doing your daily activities. You may need any of the following:    An ultrasound uses sound waves to show pictures of your cyst on a monitor.    An MRI takes pictures of your joint to show the cyst. You may be given contrast liquid to help the joint show up better. Tell the healthcare provider if you have ever had an allergic reaction to contrast liquid. Do not enter the MRI room with anything metal. Metal can cause serious injury. Tell the healthcare provider if you have any metal in or on your body.  How is a ganglion cyst treated? A ganglion cyst will usually go away on its own. You may need any of the following:    Steroid medicine may be injected into the cyst to decrease inflammation.    Aspiration may be done to drain the cyst with a needle.    Surgery may be needed to remove the cyst.  How can I manage a ganglion cyst?    Do not try to pop or break the cyst yourself. This can cause it to return.    Go to hand therapy, if needed. A hand therapist teaches you exercises to help improve movement and strength, and to decrease pain.    Wear a splint as directed to support and protect the joint that has the cyst. This will limit movement and help your cyst get smaller.  When should I call my doctor or orthopedist?    You continue to have pain, even after treatment.    Your cyst returns or gets larger.    Your limb that has the cyst gets weak, numb, stiff, or unstable.    You have questions or concerns about your condition or care.  CARE AGREEMENT:    You have the right to help plan your care. Learn about your health condition and how it may be treated. Discuss treatment options with your healthcare providers to decide what care you want to receive. You always have the right to refuse treatment.

## 2025-01-11 RX ORDER — OXYCODONE AND ACETAMINOPHEN 5; 325 MG/1; MG/1
1 TABLET ORAL
Qty: 9 | Refills: 0
Start: 2025-01-11 | End: 2025-01-13

## 2025-01-11 RX ORDER — PREDNISONE 5 MG
1 TABLET ORAL
Qty: 4 | Refills: 0
Start: 2025-01-11 | End: 2025-01-14

## 2025-01-11 RX ORDER — IBUPROFEN 200 MG
1 TABLET ORAL
Qty: 15 | Refills: 0
Start: 2025-01-11 | End: 2025-01-15

## 2025-01-17 ENCOUNTER — APPOINTMENT (OUTPATIENT)
Dept: ORTHOPEDIC SURGERY | Facility: CLINIC | Age: 50
End: 2025-01-17

## 2025-01-17 VITALS — WEIGHT: 175 LBS | BODY MASS INDEX: 23.19 KG/M2 | HEIGHT: 73 IN

## 2025-01-17 DIAGNOSIS — M19.132 POST-TRAUMATIC OSTEOARTHRITIS, LEFT WRIST: ICD-10-CM

## 2025-01-17 PROCEDURE — 99204 OFFICE O/P NEW MOD 45 MIN: CPT

## 2025-07-14 ENCOUNTER — APPOINTMENT (OUTPATIENT)
Dept: ORTHOPEDIC SURGERY | Facility: CLINIC | Age: 50
End: 2025-07-14
Payer: COMMERCIAL

## 2025-07-14 VITALS — HEIGHT: 73 IN | BODY MASS INDEX: 23.19 KG/M2 | WEIGHT: 175 LBS

## 2025-07-14 PROBLEM — M17.11 ARTHRITIS OF KNEE, RIGHT: Status: ACTIVE | Noted: 2025-07-14

## 2025-07-14 PROCEDURE — 20610 DRAIN/INJ JOINT/BURSA W/O US: CPT | Mod: 50

## 2025-07-14 PROCEDURE — 99214 OFFICE O/P EST MOD 30 MIN: CPT | Mod: 25

## 2025-07-14 RX ORDER — MELOXICAM 15 MG/1
15 TABLET ORAL
Qty: 30 | Refills: 2 | Status: ACTIVE | COMMUNITY
Start: 2025-07-14 | End: 1900-01-01

## 2025-07-16 ENCOUNTER — TRANSCRIPTION ENCOUNTER (OUTPATIENT)
Age: 50
End: 2025-07-16

## 2025-08-01 ENCOUNTER — TRANSCRIPTION ENCOUNTER (OUTPATIENT)
Age: 50
End: 2025-08-01

## 2025-08-01 RX ORDER — METHYLPREDNISOLONE 4 MG/1
4 TABLET ORAL
Qty: 1 | Refills: 0 | Status: ACTIVE | COMMUNITY
Start: 2025-08-01 | End: 1900-01-01

## 2025-08-06 ENCOUNTER — APPOINTMENT (OUTPATIENT)
Dept: ORTHOPEDIC SURGERY | Facility: CLINIC | Age: 50
End: 2025-08-06

## 2025-08-06 VITALS — BODY MASS INDEX: 23.19 KG/M2 | WEIGHT: 175 LBS | HEIGHT: 73 IN

## 2025-08-06 DIAGNOSIS — M17.0 BILATERAL PRIMARY OSTEOARTHRITIS OF KNEE: ICD-10-CM

## 2025-08-06 PROCEDURE — 20610 DRAIN/INJ JOINT/BURSA W/O US: CPT | Mod: 50

## 2025-08-06 PROCEDURE — 76942 ECHO GUIDE FOR BIOPSY: CPT | Mod: NC

## 2025-08-06 PROCEDURE — 99213 OFFICE O/P EST LOW 20 MIN: CPT | Mod: 25

## 2025-08-06 RX ORDER — DICLOFENAC SODIUM 3 G/100G
3 GEL TOPICAL TWICE DAILY
Qty: 1 | Refills: 3 | Status: ACTIVE | COMMUNITY
Start: 2025-08-06 | End: 1900-01-01

## 2025-08-08 ENCOUNTER — APPOINTMENT (OUTPATIENT)
Dept: ORTHOPEDIC SURGERY | Facility: CLINIC | Age: 50
End: 2025-08-08
Payer: COMMERCIAL

## 2025-08-08 VITALS — HEIGHT: 73 IN | WEIGHT: 180 LBS | BODY MASS INDEX: 23.86 KG/M2

## 2025-08-08 DIAGNOSIS — M17.11 UNILATERAL PRIMARY OSTEOARTHRITIS, RIGHT KNEE: ICD-10-CM

## 2025-08-08 DIAGNOSIS — M17.12 UNILATERAL PRIMARY OSTEOARTHRITIS, LEFT KNEE: ICD-10-CM

## 2025-08-08 PROCEDURE — G2211 COMPLEX E/M VISIT ADD ON: CPT | Mod: NC

## 2025-08-08 PROCEDURE — 99214 OFFICE O/P EST MOD 30 MIN: CPT

## 2025-08-08 PROCEDURE — 73564 X-RAY EXAM KNEE 4 OR MORE: CPT | Mod: LT

## 2025-08-11 ENCOUNTER — TRANSCRIPTION ENCOUNTER (OUTPATIENT)
Age: 50
End: 2025-08-11

## 2025-08-14 ENCOUNTER — APPOINTMENT (OUTPATIENT)
Dept: ORTHOPEDIC SURGERY | Facility: CLINIC | Age: 50
End: 2025-08-14

## 2025-08-21 ENCOUNTER — APPOINTMENT (OUTPATIENT)
Dept: ORTHOPEDIC SURGERY | Facility: CLINIC | Age: 50
End: 2025-08-21

## 2025-08-22 ENCOUNTER — APPOINTMENT (OUTPATIENT)
Dept: ORTHOPEDIC SURGERY | Facility: CLINIC | Age: 50
End: 2025-08-22

## 2025-08-22 PROCEDURE — 20610 DRAIN/INJ JOINT/BURSA W/O US: CPT | Mod: RT

## 2025-08-22 PROCEDURE — 99213 OFFICE O/P EST LOW 20 MIN: CPT | Mod: 25

## 2025-08-22 RX ORDER — ETODOLAC 200 MG/1
200 CAPSULE ORAL TWICE DAILY
Qty: 42 | Refills: 1 | Status: ACTIVE | COMMUNITY
Start: 2025-08-22 | End: 1900-01-01

## 2025-08-27 ENCOUNTER — APPOINTMENT (OUTPATIENT)
Dept: ORTHOPEDIC SURGERY | Facility: CLINIC | Age: 50
End: 2025-08-27
Payer: COMMERCIAL

## 2025-08-27 VITALS — BODY MASS INDEX: 24.52 KG/M2 | WEIGHT: 185 LBS | HEIGHT: 73 IN

## 2025-08-27 PROCEDURE — 20610 DRAIN/INJ JOINT/BURSA W/O US: CPT | Mod: RT

## 2025-08-27 PROCEDURE — 99213 OFFICE O/P EST LOW 20 MIN: CPT | Mod: 25

## 2025-09-02 ENCOUNTER — TRANSCRIPTION ENCOUNTER (OUTPATIENT)
Age: 50
End: 2025-09-02

## 2025-09-05 ENCOUNTER — APPOINTMENT (OUTPATIENT)
Dept: ORTHOPEDIC SURGERY | Facility: CLINIC | Age: 50
End: 2025-09-05
Payer: COMMERCIAL

## 2025-09-05 DIAGNOSIS — M17.11 UNILATERAL PRIMARY OSTEOARTHRITIS, RIGHT KNEE: ICD-10-CM

## 2025-09-05 PROCEDURE — 99213 OFFICE O/P EST LOW 20 MIN: CPT | Mod: 25

## 2025-09-05 PROCEDURE — 20610 DRAIN/INJ JOINT/BURSA W/O US: CPT | Mod: RT

## 2025-09-12 ENCOUNTER — APPOINTMENT (OUTPATIENT)
Dept: ORTHOPEDIC SURGERY | Facility: CLINIC | Age: 50
End: 2025-09-12
Payer: COMMERCIAL

## 2025-09-12 VITALS — WEIGHT: 188 LBS | HEIGHT: 72 IN | BODY MASS INDEX: 25.47 KG/M2

## 2025-09-12 DIAGNOSIS — M17.11 UNILATERAL PRIMARY OSTEOARTHRITIS, RIGHT KNEE: ICD-10-CM

## 2025-09-12 DIAGNOSIS — M17.12 UNILATERAL PRIMARY OSTEOARTHRITIS, LEFT KNEE: ICD-10-CM

## 2025-09-12 PROCEDURE — 99214 OFFICE O/P EST MOD 30 MIN: CPT

## 2025-09-12 PROCEDURE — G2211 COMPLEX E/M VISIT ADD ON: CPT | Mod: NC

## 2025-09-17 ENCOUNTER — NON-APPOINTMENT (OUTPATIENT)
Age: 50
End: 2025-09-17